# Patient Record
Sex: FEMALE | Race: BLACK OR AFRICAN AMERICAN | Employment: PART TIME | URBAN - METROPOLITAN AREA
[De-identification: names, ages, dates, MRNs, and addresses within clinical notes are randomized per-mention and may not be internally consistent; named-entity substitution may affect disease eponyms.]

---

## 2018-12-27 ENCOUNTER — HOSPITAL ENCOUNTER (EMERGENCY)
Age: 38
Discharge: HOME | End: 2018-12-28
Attending: EMERGENCY MEDICINE
Payer: COMMERCIAL

## 2018-12-27 DIAGNOSIS — R07.9 CHEST PAIN, UNSPECIFIED TYPE: Primary | ICD-10-CM

## 2018-12-27 DIAGNOSIS — K30 INDIGESTION: ICD-10-CM

## 2018-12-27 PROCEDURE — 85025 COMPLETE CBC W/AUTO DIFF WBC: CPT

## 2018-12-27 PROCEDURE — 80053 COMPREHEN METABOLIC PANEL: CPT

## 2018-12-27 PROCEDURE — 93005 ELECTROCARDIOGRAM TRACING: CPT | Performed by: EMERGENCY MEDICINE

## 2018-12-27 PROCEDURE — 84484 ASSAY OF TROPONIN QUANT: CPT

## 2018-12-27 PROCEDURE — 36415 COLL VENOUS BLD VENIPUNCTURE: CPT

## 2018-12-27 PROCEDURE — 99284 EMERGENCY DEPT VISIT MOD MDM: CPT

## 2018-12-27 ASSESSMENT — PAIN DESCRIPTION - PAIN TYPE: TYPE: ACUTE PAIN

## 2018-12-27 ASSESSMENT — PAIN SCALES - GENERAL: PAINLEVEL_OUTOF10: 6

## 2018-12-27 ASSESSMENT — PAIN DESCRIPTION - FREQUENCY: FREQUENCY: CONTINUOUS

## 2018-12-27 ASSESSMENT — PAIN DESCRIPTION - DESCRIPTORS: DESCRIPTORS: ACHING

## 2018-12-27 ASSESSMENT — PAIN DESCRIPTION - ORIENTATION: ORIENTATION: MID

## 2018-12-27 ASSESSMENT — PAIN DESCRIPTION - LOCATION: LOCATION: CHEST

## 2018-12-28 ENCOUNTER — APPOINTMENT (OUTPATIENT)
Dept: GENERAL RADIOLOGY | Age: 38
End: 2018-12-28
Payer: COMMERCIAL

## 2018-12-28 VITALS
HEART RATE: 75 BPM | DIASTOLIC BLOOD PRESSURE: 89 MMHG | RESPIRATION RATE: 18 BRPM | WEIGHT: 270.73 LBS | BODY MASS INDEX: 42.49 KG/M2 | OXYGEN SATURATION: 99 % | TEMPERATURE: 97 F | HEIGHT: 67 IN | SYSTOLIC BLOOD PRESSURE: 141 MMHG

## 2018-12-28 LAB
A/G RATIO: 1.3 (ref 1.1–2.2)
ALBUMIN SERPL-MCNC: 4 G/DL (ref 3.4–5)
ALP BLD-CCNC: 69 U/L (ref 40–129)
ALT SERPL-CCNC: 16 U/L (ref 10–40)
ANION GAP SERPL CALCULATED.3IONS-SCNC: 13 MMOL/L (ref 3–16)
AST SERPL-CCNC: 25 U/L (ref 15–37)
BASOPHILS ABSOLUTE: 0.1 K/UL (ref 0–0.2)
BASOPHILS RELATIVE PERCENT: 1.6 %
BILIRUB SERPL-MCNC: <0.2 MG/DL (ref 0–1)
BUN BLDV-MCNC: 13 MG/DL (ref 7–20)
CALCIUM SERPL-MCNC: 9.2 MG/DL (ref 8.3–10.6)
CHLORIDE BLD-SCNC: 101 MMOL/L (ref 99–110)
CO2: 23 MMOL/L (ref 21–32)
CREAT SERPL-MCNC: 0.8 MG/DL (ref 0.6–1.1)
EKG ATRIAL RATE: 82 BPM
EKG DIAGNOSIS: NORMAL
EKG P AXIS: 28 DEGREES
EKG P-R INTERVAL: 164 MS
EKG Q-T INTERVAL: 406 MS
EKG QRS DURATION: 98 MS
EKG QTC CALCULATION (BAZETT): 474 MS
EKG R AXIS: 47 DEGREES
EKG T AXIS: 43 DEGREES
EKG VENTRICULAR RATE: 82 BPM
EOSINOPHILS ABSOLUTE: 0.4 K/UL (ref 0–0.6)
EOSINOPHILS RELATIVE PERCENT: 5.3 %
GFR AFRICAN AMERICAN: >60
GFR NON-AFRICAN AMERICAN: >60
GLOBULIN: 3.1 G/DL
GLUCOSE BLD-MCNC: 114 MG/DL (ref 70–99)
HCT VFR BLD CALC: 40.2 % (ref 36–48)
HEMOGLOBIN: 13.6 G/DL (ref 12–16)
LYMPHOCYTES ABSOLUTE: 3.2 K/UL (ref 1–5.1)
LYMPHOCYTES RELATIVE PERCENT: 41.1 %
MCH RBC QN AUTO: 30.4 PG (ref 26–34)
MCHC RBC AUTO-ENTMCNC: 33.7 G/DL (ref 31–36)
MCV RBC AUTO: 90.2 FL (ref 80–100)
MONOCYTES ABSOLUTE: 0.5 K/UL (ref 0–1.3)
MONOCYTES RELATIVE PERCENT: 7 %
NEUTROPHILS ABSOLUTE: 3.5 K/UL (ref 1.7–7.7)
NEUTROPHILS RELATIVE PERCENT: 45 %
PDW BLD-RTO: 13.8 % (ref 12.4–15.4)
PLATELET # BLD: 268 K/UL (ref 135–450)
PMV BLD AUTO: 10.2 FL (ref 5–10.5)
POTASSIUM SERPL-SCNC: 4.1 MMOL/L (ref 3.5–5.1)
RBC # BLD: 4.46 M/UL (ref 4–5.2)
SODIUM BLD-SCNC: 137 MMOL/L (ref 136–145)
TOTAL PROTEIN: 7.1 G/DL (ref 6.4–8.2)
TROPONIN: <0.01 NG/ML
WBC # BLD: 7.8 K/UL (ref 4–11)

## 2018-12-28 PROCEDURE — 93010 ELECTROCARDIOGRAM REPORT: CPT | Performed by: INTERNAL MEDICINE

## 2018-12-28 PROCEDURE — 71045 X-RAY EXAM CHEST 1 VIEW: CPT

## 2018-12-28 PROCEDURE — 6370000000 HC RX 637 (ALT 250 FOR IP): Performed by: EMERGENCY MEDICINE

## 2018-12-28 RX ORDER — FAMOTIDINE 20 MG/1
20 TABLET, FILM COATED ORAL 2 TIMES DAILY
Qty: 60 TABLET | Refills: 0 | Status: SHIPPED | OUTPATIENT
Start: 2018-12-28 | End: 2019-12-03

## 2018-12-28 RX ADMIN — LIDOCAINE HYDROCHLORIDE: 20 SOLUTION ORAL; TOPICAL at 00:17

## 2018-12-28 ASSESSMENT — HEART SCORE: ECG: 1

## 2019-12-03 ENCOUNTER — OFFICE VISIT (OUTPATIENT)
Dept: PRIMARY CARE CLINIC | Age: 39
End: 2019-12-03
Payer: COMMERCIAL

## 2019-12-03 VITALS
HEIGHT: 68 IN | HEART RATE: 77 BPM | DIASTOLIC BLOOD PRESSURE: 102 MMHG | OXYGEN SATURATION: 99 % | TEMPERATURE: 97.7 F | WEIGHT: 271 LBS | SYSTOLIC BLOOD PRESSURE: 172 MMHG | BODY MASS INDEX: 41.07 KG/M2

## 2019-12-03 DIAGNOSIS — I10 ESSENTIAL HYPERTENSION: ICD-10-CM

## 2019-12-03 DIAGNOSIS — F32.1 CURRENT MODERATE EPISODE OF MAJOR DEPRESSIVE DISORDER WITHOUT PRIOR EPISODE (HCC): ICD-10-CM

## 2019-12-03 DIAGNOSIS — Z72.0 TOBACCO ABUSE: ICD-10-CM

## 2019-12-03 DIAGNOSIS — R01.1 HEART MURMUR: ICD-10-CM

## 2019-12-03 DIAGNOSIS — Z23 NEED FOR 23-POLYVALENT PNEUMOCOCCAL POLYSACCHARIDE VACCINE: ICD-10-CM

## 2019-12-03 DIAGNOSIS — Z00.00 PREVENTATIVE HEALTH CARE: ICD-10-CM

## 2019-12-03 DIAGNOSIS — K21.9 GASTROESOPHAGEAL REFLUX DISEASE WITHOUT ESOPHAGITIS: ICD-10-CM

## 2019-12-03 DIAGNOSIS — Z23 NEED FOR DIPHTHERIA-TETANUS-PERTUSSIS (TDAP) VACCINE: ICD-10-CM

## 2019-12-03 DIAGNOSIS — Z00.00 PREVENTATIVE HEALTH CARE: Primary | ICD-10-CM

## 2019-12-03 LAB
ALBUMIN SERPL-MCNC: 4.5 G/DL (ref 3.4–5)
ALP BLD-CCNC: 70 U/L (ref 40–129)
ALT SERPL-CCNC: 13 U/L (ref 10–40)
ANION GAP SERPL CALCULATED.3IONS-SCNC: 16 MMOL/L (ref 3–16)
AST SERPL-CCNC: 16 U/L (ref 15–37)
BASOPHILS ABSOLUTE: 0.1 K/UL (ref 0–0.2)
BASOPHILS RELATIVE PERCENT: 1 %
BILIRUB SERPL-MCNC: 0.3 MG/DL (ref 0–1)
BILIRUBIN DIRECT: <0.2 MG/DL (ref 0–0.3)
BILIRUBIN, INDIRECT: NORMAL MG/DL (ref 0–1)
BUN BLDV-MCNC: 12 MG/DL (ref 7–20)
CALCIUM SERPL-MCNC: 10 MG/DL (ref 8.3–10.6)
CHLORIDE BLD-SCNC: 104 MMOL/L (ref 99–110)
CHOLESTEROL, TOTAL: 195 MG/DL (ref 0–199)
CO2: 23 MMOL/L (ref 21–32)
CREAT SERPL-MCNC: 0.9 MG/DL (ref 0.6–1.1)
EOSINOPHILS ABSOLUTE: 0.2 K/UL (ref 0–0.6)
EOSINOPHILS RELATIVE PERCENT: 3 %
GFR AFRICAN AMERICAN: >60
GFR NON-AFRICAN AMERICAN: >60
GLUCOSE BLD-MCNC: 72 MG/DL (ref 70–99)
HCT VFR BLD CALC: 44.4 % (ref 36–48)
HDLC SERPL-MCNC: 63 MG/DL (ref 40–60)
HEMOGLOBIN: 14.7 G/DL (ref 12–16)
LDL CHOLESTEROL CALCULATED: 109 MG/DL
LYMPHOCYTES ABSOLUTE: 2.8 K/UL (ref 1–5.1)
LYMPHOCYTES RELATIVE PERCENT: 37.9 %
MCH RBC QN AUTO: 30.2 PG (ref 26–34)
MCHC RBC AUTO-ENTMCNC: 33.2 G/DL (ref 31–36)
MCV RBC AUTO: 91.1 FL (ref 80–100)
MONOCYTES ABSOLUTE: 0.6 K/UL (ref 0–1.3)
MONOCYTES RELATIVE PERCENT: 8.5 %
NEUTROPHILS ABSOLUTE: 3.6 K/UL (ref 1.7–7.7)
NEUTROPHILS RELATIVE PERCENT: 49.6 %
PDW BLD-RTO: 13.9 % (ref 12.4–15.4)
PHOSPHORUS: 3.1 MG/DL (ref 2.5–4.9)
PLATELET # BLD: 265 K/UL (ref 135–450)
PMV BLD AUTO: 10.3 FL (ref 5–10.5)
POTASSIUM SERPL-SCNC: 4.6 MMOL/L (ref 3.5–5.1)
RBC # BLD: 4.87 M/UL (ref 4–5.2)
SODIUM BLD-SCNC: 143 MMOL/L (ref 136–145)
TOTAL PROTEIN: 7.3 G/DL (ref 6.4–8.2)
TRIGL SERPL-MCNC: 115 MG/DL (ref 0–150)
TSH REFLEX: 1.1 UIU/ML (ref 0.27–4.2)
VLDLC SERPL CALC-MCNC: 23 MG/DL
WBC # BLD: 7.3 K/UL (ref 4–11)

## 2019-12-03 PROCEDURE — 90715 TDAP VACCINE 7 YRS/> IM: CPT | Performed by: INTERNAL MEDICINE

## 2019-12-03 PROCEDURE — 99386 PREV VISIT NEW AGE 40-64: CPT | Performed by: INTERNAL MEDICINE

## 2019-12-03 PROCEDURE — 90471 IMMUNIZATION ADMIN: CPT | Performed by: INTERNAL MEDICINE

## 2019-12-03 PROCEDURE — G8484 FLU IMMUNIZE NO ADMIN: HCPCS | Performed by: INTERNAL MEDICINE

## 2019-12-03 PROCEDURE — 90472 IMMUNIZATION ADMIN EACH ADD: CPT | Performed by: INTERNAL MEDICINE

## 2019-12-03 PROCEDURE — 90732 PPSV23 VACC 2 YRS+ SUBQ/IM: CPT | Performed by: INTERNAL MEDICINE

## 2019-12-03 RX ORDER — NIFEDIPINE 30 MG/1
30 TABLET, EXTENDED RELEASE ORAL DAILY
Qty: 90 TABLET | Refills: 1 | Status: SHIPPED | OUTPATIENT
Start: 2019-12-03 | End: 2020-11-19 | Stop reason: SDUPTHER

## 2019-12-03 RX ORDER — CHLORTHALIDONE 25 MG/1
25 TABLET ORAL DAILY
Qty: 90 TABLET | Refills: 1 | Status: SHIPPED | OUTPATIENT
Start: 2019-12-03 | End: 2020-11-19

## 2019-12-03 RX ORDER — OMEPRAZOLE 20 MG/1
20 CAPSULE, DELAYED RELEASE ORAL DAILY PRN
Qty: 30 CAPSULE | Refills: 5 | Status: SHIPPED | OUTPATIENT
Start: 2019-12-03 | End: 2022-01-27

## 2019-12-03 RX ORDER — NICOTINE 21 MG/24HR
1 PATCH, TRANSDERMAL 24 HOURS TRANSDERMAL DAILY
Qty: 30 PATCH | Refills: 5 | Status: SHIPPED | OUTPATIENT
Start: 2019-12-03 | End: 2020-01-14

## 2019-12-03 RX ORDER — BUPROPION HYDROCHLORIDE 150 MG/1
150 TABLET, EXTENDED RELEASE ORAL 2 TIMES DAILY
Qty: 60 TABLET | Refills: 3 | Status: CANCELLED | OUTPATIENT
Start: 2019-12-03

## 2019-12-03 RX ORDER — BUPROPION HYDROCHLORIDE 150 MG/1
150 TABLET ORAL EVERY MORNING
Qty: 90 TABLET | Refills: 1 | Status: SHIPPED | OUTPATIENT
Start: 2019-12-03 | End: 2020-01-14

## 2019-12-03 ASSESSMENT — PATIENT HEALTH QUESTIONNAIRE - PHQ9
2. FEELING DOWN, DEPRESSED OR HOPELESS: 1
SUM OF ALL RESPONSES TO PHQ QUESTIONS 1-9: 12
7. TROUBLE CONCENTRATING ON THINGS, SUCH AS READING THE NEWSPAPER OR WATCHING TELEVISION: 1
SUM OF ALL RESPONSES TO PHQ9 QUESTIONS 1 & 2: 4
5. POOR APPETITE OR OVEREATING: 3
9. THOUGHTS THAT YOU WOULD BE BETTER OFF DEAD, OR OF HURTING YOURSELF: 0
10. IF YOU CHECKED OFF ANY PROBLEMS, HOW DIFFICULT HAVE THESE PROBLEMS MADE IT FOR YOU TO DO YOUR WORK, TAKE CARE OF THINGS AT HOME, OR GET ALONG WITH OTHER PEOPLE: 1
6. FEELING BAD ABOUT YOURSELF - OR THAT YOU ARE A FAILURE OR HAVE LET YOURSELF OR YOUR FAMILY DOWN: 1
4. FEELING TIRED OR HAVING LITTLE ENERGY: 1
3. TROUBLE FALLING OR STAYING ASLEEP: 1
8. MOVING OR SPEAKING SO SLOWLY THAT OTHER PEOPLE COULD HAVE NOTICED. OR THE OPPOSITE, BEING SO FIGETY OR RESTLESS THAT YOU HAVE BEEN MOVING AROUND A LOT MORE THAN USUAL: 1
1. LITTLE INTEREST OR PLEASURE IN DOING THINGS: 3
SUM OF ALL RESPONSES TO PHQ QUESTIONS 1-9: 12
DEPRESSION UNABLE TO ASSESS: FUNCTIONAL CAPACITY MOTIVATION LIMITS ACCURACY

## 2019-12-03 ASSESSMENT — ENCOUNTER SYMPTOMS
COUGH: 0
VOMITING: 0
NAUSEA: 0
ABDOMINAL PAIN: 0
DIARRHEA: 0
SHORTNESS OF BREATH: 0
CONSTIPATION: 0

## 2019-12-04 DIAGNOSIS — I10 ESSENTIAL HYPERTENSION: ICD-10-CM

## 2019-12-04 DIAGNOSIS — E78.00 PURE HYPERCHOLESTEROLEMIA: Primary | ICD-10-CM

## 2019-12-04 LAB
ESTIMATED AVERAGE GLUCOSE: 111.2 MG/DL
HBA1C MFR BLD: 5.5 %
HIV AG/AB: NORMAL
HIV ANTIGEN: NORMAL
HIV-1 ANTIBODY: NORMAL
HIV-2 AB: NORMAL

## 2019-12-04 RX ORDER — ATORVASTATIN CALCIUM 10 MG/1
10 TABLET, FILM COATED ORAL DAILY
Qty: 30 TABLET | Refills: 5 | Status: SHIPPED | OUTPATIENT
Start: 2019-12-04 | End: 2020-01-14 | Stop reason: SDUPTHER

## 2020-01-14 ENCOUNTER — OFFICE VISIT (OUTPATIENT)
Dept: PRIMARY CARE CLINIC | Age: 40
End: 2020-01-14
Payer: COMMERCIAL

## 2020-01-14 VITALS
RESPIRATION RATE: 12 BRPM | OXYGEN SATURATION: 96 % | SYSTOLIC BLOOD PRESSURE: 134 MMHG | BODY MASS INDEX: 42.38 KG/M2 | HEIGHT: 67 IN | DIASTOLIC BLOOD PRESSURE: 82 MMHG | HEART RATE: 69 BPM | WEIGHT: 270 LBS

## 2020-01-14 PROBLEM — B00.9 HERPES: Status: ACTIVE | Noted: 2020-01-14

## 2020-01-14 PROCEDURE — 4004F PT TOBACCO SCREEN RCVD TLK: CPT | Performed by: INTERNAL MEDICINE

## 2020-01-14 PROCEDURE — 99214 OFFICE O/P EST MOD 30 MIN: CPT | Performed by: INTERNAL MEDICINE

## 2020-01-14 PROCEDURE — G8417 CALC BMI ABV UP PARAM F/U: HCPCS | Performed by: INTERNAL MEDICINE

## 2020-01-14 PROCEDURE — G8427 DOCREV CUR MEDS BY ELIG CLIN: HCPCS | Performed by: INTERNAL MEDICINE

## 2020-01-14 PROCEDURE — G8484 FLU IMMUNIZE NO ADMIN: HCPCS | Performed by: INTERNAL MEDICINE

## 2020-01-14 RX ORDER — BUPROPION HYDROCHLORIDE 300 MG/1
300 TABLET ORAL EVERY MORNING
Qty: 90 TABLET | Refills: 1 | Status: SHIPPED | OUTPATIENT
Start: 2020-01-14 | End: 2020-11-19

## 2020-01-14 RX ORDER — VALACYCLOVIR HYDROCHLORIDE 1 G/1
1000 TABLET, FILM COATED ORAL DAILY
Qty: 90 TABLET | Refills: 1 | Status: SHIPPED | OUTPATIENT
Start: 2020-01-14 | End: 2020-11-19

## 2020-01-14 RX ORDER — ATORVASTATIN CALCIUM 10 MG/1
10 TABLET, FILM COATED ORAL DAILY
Qty: 90 TABLET | Refills: 1 | Status: SHIPPED | OUTPATIENT
Start: 2020-01-14 | End: 2020-11-19

## 2020-01-14 ASSESSMENT — ENCOUNTER SYMPTOMS
SHORTNESS OF BREATH: 0
VOMITING: 0
DIARRHEA: 0
ABDOMINAL PAIN: 0
CONSTIPATION: 0
COUGH: 0
NAUSEA: 0

## 2020-01-14 NOTE — PATIENT INSTRUCTIONS

## 2020-11-18 ENCOUNTER — NURSE TRIAGE (OUTPATIENT)
Dept: OTHER | Facility: CLINIC | Age: 40
End: 2020-11-18

## 2020-11-18 ENCOUNTER — TELEPHONE (OUTPATIENT)
Dept: PRIMARY CARE CLINIC | Age: 40
End: 2020-11-18

## 2020-11-18 NOTE — TELEPHONE ENCOUNTER
Reason for Disposition   Patient wants to be seen    Answer Assessment - Initial Assessment Questions  1. BLOOD PRESSURE: \"What is the blood pressure? \" \"Did you take at least two measurements 5 minutes apart? \"        No     2. ONSET: \"When did you take your blood pressure? \"        NA    3. HOW: \"How did you obtain the blood pressure? \" (e.g., visiting nurse, automatic home BP monitor)        NA    4. HISTORY: \"Do you have a history of high blood pressure? \"        Yes    5. MEDICATIONS: Daphine Jessica you taking any medications for blood pressure? \" \"Have you missed any doses recently? \"        I supposed to be but I have been out of medicine since the end of July    6. OTHER SYMPTOMS: \"Do you have any symptoms? \" (e.g., headache, chest pain, blurred vision, difficulty breathing, weakness)         ankle swelling , can feel pressure in the back of head , stand up quick and get dizzy     7. PREGNANCY: \"Is there any chance you are pregnant? \" \"When was your last menstrual period? \"        No, 11/04/2020    Protocols used: HIGH BLOOD PRESSURE-ADULT-OH  Patient called pre-service center [facility] with red flag complaint. Brief description of triage: head pressure , ankle swelling , lightheadedness when standing    Triage indicates for patient to be seen today    Care advice provided, patient verbalizes understanding; denies any other questions or concerns; instructed to call back for any new or worsening symptoms. Writer provided warm transfer to [place] at [facility] for appointment scheduling. Attention Provider: Thank you for allowing me to participate in the care of your patient. The patient was connected to triage in response to information provided to the [facility]. Please do not respond through this encounter as the response is not directed to a shared pool.

## 2020-11-18 NOTE — TELEPHONE ENCOUNTER
----- Message from Shameka Ramirez sent at 11/18/2020 11:22 AM EST -----  Subject: Message to Provider    QUESTIONS  Information for Provider? URGENT APPOINTMENT Nurse Triaged? Tony Shayne Patient is   calling to schedule an appointment regarding blood pressure and   medication. Patient has been without medication for 3 months. Suggested   that patient is seen today.  ---------------------------------------------------------------------------  --------------  CALL BACK INFO  What is the best way for the office to contact you? OK to leave message on   voicemail  Preferred Call Back Phone Number? [phone]  ---------------------------------------------------------------------------  --------------  SCRIPT ANSWERS  Relationship to Patient?  Self

## 2020-11-18 NOTE — PROGRESS NOTES
2020    Kin Charles (:  1980) is a 38 y.o. female, here for evaluation of the following medical concerns:    Chief Complaint   Patient presents with   Joleen Schirmer Doctor       HPI  79-year-old -American female history of treated hypertension treated depression nicotine dependence GERD and recurrent herpes labialis prescribed chronic suppressive therapy using as needed comes in establish care. She last saw her primary last year, labs last December. HIV TSH A1c unremarkable at that time. Regarding her depression, tells me that she weaned herself off of Wellbutrin but feels that she is overall doing reasonably well. Regarding hypertension, she did not seek refills when she ran out of her 2 blood pressure medicines 6 months ago, has not checked her blood pressure but has noticed increasing leg swelling and headache, blood pressure elevated in the clinic today. Regarding her history of nicotine dependence, she unfortunately continues to smoke half a pack of cigarettes per day. Precontemplative. Regarding her GERD, she denies solid food dysphagia breakthrough symptoms, and is only using the Prilosec as needed, Tums more frequently. Regarding her herpes labialis, she is experiencing about 2 bouts a year, and will take 1000 mg Valtrex daily upon outbreak. Believes she has had 2 episodes of genital herpes in her life. Regarding her chart history of dyslipidemia, she was prescribed atorvastatin 10 mg daily which she took for a month or 2. Assuming SBP of 140, pretreatment labs confirm overall a 3.7% 10-year risk of a cardiovascular event. Review of Systems   Constitutional: Negative for activity change, appetite change, fatigue and unexpected weight change. HENT: Negative for dental problem, sinus pain, sore throat and trouble swallowing. Eyes: Negative for pain and visual disturbance.    Respiratory: Negative for apnea, cough, chest tightness, shortness of breath and wheezing. Cardiovascular: Negative for chest pain and palpitations. Gastrointestinal: Negative for abdominal pain, blood in stool, constipation, diarrhea, nausea, rectal pain and vomiting. Endocrine: Negative for cold intolerance, heat intolerance, polydipsia, polyphagia and polyuria. Genitourinary: Negative for difficulty urinating, dysuria, flank pain, frequency, hematuria, pelvic pain, urgency, vaginal bleeding and vaginal discharge. Musculoskeletal: Negative for arthralgias, back pain, gait problem, joint swelling, myalgias, neck pain and neck stiffness. Skin: Negative for color change and rash. Neurological: Negative for dizziness, tremors, syncope, speech difficulty, weakness, light-headedness and headaches. Hematological: Negative for adenopathy. Does not bruise/bleed easily. Psychiatric/Behavioral: Negative for agitation, behavioral problems, decreased concentration, sleep disturbance and suicidal ideas. The patient is not nervous/anxious and is not hyperactive. Prior to Visit Medications    Medication Sig Taking? Authorizing Provider   NIFEdipine (PROCARDIA XL) 30 MG extended release tablet Take 1 tablet by mouth daily Yes Miriam Webber MD   busPIRone (BUSPAR) 10 MG tablet Take 1 tablet by mouth 2 times daily as needed (anxiety) Yes Miriam Webber MD   chlorthalidone (HYGROTON) 25 MG tablet Take 1 tablet by mouth daily Yes Miriam Webber MD   valACYclovir (VALTREX) 1 g tablet Take 2 tablets by mouth 2 times daily for 1 day At first sign of cold sore Yes Miriam Webber MD   omeprazole (PRILOSEC) 20 MG delayed release capsule Take 1 capsule by mouth daily as needed (heartburn) Yes Royal Rangel MD        No Known Allergies    No past medical history on file. Hypertension, 2019. Herpes labialis, 2019  No past surgical history on file.     Social History     Socioeconomic History    Marital status: Single     Spouse name: Not on file    Number of children: Not on file  Years of education: Not on file    Highest education level: Not on file   Occupational History    Not on file   Social Needs    Financial resource strain: Not on file    Food insecurity     Worry: Not on file     Inability: Not on file    Transportation needs     Medical: Not on file     Non-medical: Not on file   Tobacco Use    Smoking status: Current Every Day Smoker     Packs/day: 1.00     Types: Cigarettes    Smokeless tobacco: Never Used   Substance and Sexual Activity    Alcohol use: Yes     Comment: occ    Drug use: Not on file    Sexual activity: Not Currently     Birth control/protection: Condom   Lifestyle    Physical activity     Days per week: Not on file     Minutes per session: Not on file    Stress: Not on file   Relationships    Social connections     Talks on phone: Not on file     Gets together: Not on file     Attends Oriental orthodox service: Not on file     Active member of club or organization: Not on file     Attends meetings of clubs or organizations: Not on file     Relationship status: Not on file    Intimate partner violence     Fear of current or ex partner: Not on file     Emotionally abused: Not on file     Physically abused: Not on file     Forced sexual activity: Not on file   Other Topics Concern    Not on file   Social History Narrative    Not on file        No family history on file. Vitals:    11/19/20 1123 11/19/20 1133   BP: (!) 152/111 (!) 152/110   Pulse: 79    Temp: 97.2 °F (36.2 °C)    TempSrc: Oral    SpO2: 99%    Weight: 276 lb (125.2 kg)    Height: 5' 7\" (1.702 m)      Estimated body mass index is 43.23 kg/m² as calculated from the following:    Height as of this encounter: 5' 7\" (1.702 m). Weight as of this encounter: 276 lb (125.2 kg). PHYSICAL EXAM  GENERAL:  Pleasant  female who looks her stated age, awake alert and oriented x3, no acute distress. PSYCH: Quite animated, good eye contact. Unrestricted affect range.   Mood congruent with affect. Linear thought. LABS  Lab Review   Orders Only on 12/03/2019   Component Date Value    TSH 12/03/2019 1.10     HIV Ag/Ab 12/03/2019 Non-Reactive     HIV-1 Antibody 12/03/2019 Non-Reactive     HIV ANTIGEN 12/03/2019 Non-Reactive     HIV-2 Ab 12/03/2019 Non-Reactive     Sodium 12/03/2019 143     Potassium 12/03/2019 4.6     Chloride 12/03/2019 104     CO2 12/03/2019 23     Anion Gap 12/03/2019 16     Glucose 12/03/2019 72     BUN 12/03/2019 12     CREATININE 12/03/2019 0.9     GFR Non- 12/03/2019 >60     GFR  12/03/2019 >60     Calcium 12/03/2019 10.0     Phosphorus 12/03/2019 3.1     Alb 12/03/2019 4.5     Cholesterol, Total 12/03/2019 195     Triglycerides 12/03/2019 115     HDL 12/03/2019 63*    LDL Calculated 12/03/2019 109*    VLDL Cholesterol Calcula* 12/03/2019 23     Total Protein 12/03/2019 7.3     Alkaline Phosphatase 12/03/2019 70     ALT 12/03/2019 13     AST 12/03/2019 16     Total Bilirubin 12/03/2019 0.3     Bilirubin, Direct 12/03/2019 <0.2     Bilirubin, Indirect 12/03/2019 see below     Hemoglobin A1C 12/03/2019 5.5     eAG 12/03/2019 111.2     WBC 12/03/2019 7.3     RBC 12/03/2019 4.87     Hemoglobin 12/03/2019 14.7     Hematocrit 12/03/2019 44.4     MCV 12/03/2019 91.1     MCH 12/03/2019 30.2     MCHC 12/03/2019 33.2     RDW 12/03/2019 13.9     Platelets 64/59/0089 265     MPV 12/03/2019 10.3     Neutrophils % 12/03/2019 49.6     Lymphocytes % 12/03/2019 37.9     Monocytes % 12/03/2019 8.5     Eosinophils % 12/03/2019 3.0     Basophils % 12/03/2019 1.0     Neutrophils Absolute 12/03/2019 3.6     Lymphocytes Absolute 12/03/2019 2.8     Monocytes Absolute 12/03/2019 0.6     Eosinophils Absolute 12/03/2019 0.2     Basophils Absolute 12/03/2019 0.1          ASSESSMENT/PLAN  1. Morbidly obese ([address]  She stress eats, has gained about 5 pounds since her last office visit.   She snores infrequently she tells me, denies a.m. headaches dry mouth in the morning. Not take naps in the afternoon. Educated patient that weight loss agents without change in diet and exercise tend to resulted in weight yoyoing back up to baseline. 2. Essential hypertension  Wonder about sleep apnea. Update electrolytes neuroid normal last year. Asked patient to get home cuff bring record to her follow-up visit.  - NIFEipine (PROCARDIA XL) 30 MG extended release tablet; Take 1 tablet by mouth daily  Dispense: 90 tablet; Refill: 3  - ALT; Future  - Lipid Panel; Future  - Basic Metabolic Panel; Future    3. Need for immunization against influenza  Nicotine abuse, Covid led to strongly recommend influenza vaccine this year, which the patient was agreeable to.    4. Gastroesophageal reflux disease without esophagitis  No red flags. Discussed Pepcid if Tums was inadequate as a safe alternative to Prilosec. 5. Depression, major, single episode, mild ([address]  She agrees that anxiety is often troubles her and we educated her on Wellbutrin not being abortive therapy. Prescribed BuSpar to try as needed. 6. Tobacco abuse  Precontemplative. 7 herpes labialis. Recommended abortive therapy only, 2 g twice daily for total of 2 doses at first sign of herpes. Prescription provided. 8.  Health maintenance issues. Update lipid profile, electrolytes, glucose, TSH normal last year. Patient gets Pap smears every year or 2 at [facility]. Presume they will coordinate/discuss mammogram.      Return in about 4 weeks (around 12/17/2020) for HTN .       Colin Gomez MD

## 2020-11-19 ENCOUNTER — TELEPHONE (OUTPATIENT)
Dept: PRIMARY CARE CLINIC | Age: 40
End: 2020-11-19

## 2020-11-19 ENCOUNTER — OFFICE VISIT (OUTPATIENT)
Dept: PRIMARY CARE CLINIC | Age: 40
End: 2020-11-19
Payer: COMMERCIAL

## 2020-11-19 VITALS
HEART RATE: 79 BPM | BODY MASS INDEX: 43.32 KG/M2 | DIASTOLIC BLOOD PRESSURE: 110 MMHG | SYSTOLIC BLOOD PRESSURE: 152 MMHG | OXYGEN SATURATION: 99 % | TEMPERATURE: 97.2 F | WEIGHT: 276 LBS | HEIGHT: 67 IN

## 2020-11-19 PROBLEM — B00.9 HERPES: Status: RESOLVED | Noted: 2020-01-14 | Resolved: 2020-11-19

## 2020-11-19 PROBLEM — F32.1 CURRENT MODERATE EPISODE OF MAJOR DEPRESSIVE DISORDER WITHOUT PRIOR EPISODE (HCC): Status: RESOLVED | Noted: 2019-12-03 | Resolved: 2020-11-19

## 2020-11-19 PROBLEM — E66.01 MORBIDLY OBESE (HCC): Status: ACTIVE | Noted: 2020-11-19

## 2020-11-19 PROBLEM — E78.00 PURE HYPERCHOLESTEROLEMIA: Status: RESOLVED | Noted: 2019-12-04 | Resolved: 2020-11-19

## 2020-11-19 PROCEDURE — 90471 IMMUNIZATION ADMIN: CPT | Performed by: INTERNAL MEDICINE

## 2020-11-19 PROCEDURE — 90686 IIV4 VACC NO PRSV 0.5 ML IM: CPT | Performed by: INTERNAL MEDICINE

## 2020-11-19 PROCEDURE — 99214 OFFICE O/P EST MOD 30 MIN: CPT | Performed by: INTERNAL MEDICINE

## 2020-11-19 RX ORDER — VALACYCLOVIR HYDROCHLORIDE 1 G/1
2000 TABLET, FILM COATED ORAL 2 TIMES DAILY
Qty: 8 TABLET | Refills: 0 | Status: SHIPPED | OUTPATIENT
Start: 2020-11-19 | End: 2020-11-20

## 2020-11-19 RX ORDER — CHLORTHALIDONE 25 MG/1
25 TABLET ORAL DAILY
Qty: 90 TABLET | Refills: 3 | Status: SHIPPED | OUTPATIENT
Start: 2020-11-19 | End: 2022-01-10

## 2020-11-19 RX ORDER — BUSPIRONE HYDROCHLORIDE 10 MG/1
10 TABLET ORAL 2 TIMES DAILY PRN
Qty: 60 TABLET | Refills: 1 | Status: SHIPPED | OUTPATIENT
Start: 2020-11-19 | End: 2020-12-19

## 2020-11-19 RX ORDER — NIFEDIPINE 30 MG/1
30 TABLET, EXTENDED RELEASE ORAL DAILY
Qty: 90 TABLET | Refills: 3 | Status: SHIPPED | OUTPATIENT
Start: 2020-11-19 | End: 2022-01-10

## 2020-11-19 NOTE — PATIENT INSTRUCTIONS
1. If tums not controlling your reflux symptoms, consider OTC Pepcid, if really needed omeprazole  2. Rxs for buspar 10mg 2x/day AS NEEDED (not every day)  3. Call when want to set a quit date  4. Rx for valtrex chlorthal nifedipine sent  5. Get a BP cuff, large adult autoinflating LCD readout. Check your BP 10 x over the next month, after starting medicine, and bring to f/u visit   6.  Blood work next month before f/u visit

## 2021-05-18 VITALS
HEART RATE: 81 BPM | OXYGEN SATURATION: 98 % | DIASTOLIC BLOOD PRESSURE: 67 MMHG | BODY MASS INDEX: 41.21 KG/M2 | RESPIRATION RATE: 19 BRPM | HEIGHT: 67 IN | WEIGHT: 262.57 LBS | SYSTOLIC BLOOD PRESSURE: 183 MMHG | TEMPERATURE: 97.9 F

## 2021-05-18 ASSESSMENT — PAIN DESCRIPTION - PROGRESSION: CLINICAL_PROGRESSION: NOT CHANGED

## 2021-05-18 ASSESSMENT — PAIN DESCRIPTION - ONSET: ONSET: ON-GOING

## 2021-05-18 ASSESSMENT — PAIN DESCRIPTION - LOCATION: LOCATION: BACK;NECK

## 2021-05-18 ASSESSMENT — PAIN DESCRIPTION - PAIN TYPE: TYPE: ACUTE PAIN

## 2021-05-19 ENCOUNTER — HOSPITAL ENCOUNTER (EMERGENCY)
Age: 41
Discharge: LEFT BEFORE DISCHARGE | End: 2021-05-19
Payer: COMMERCIAL

## 2021-06-03 ENCOUNTER — TELEPHONE (OUTPATIENT)
Dept: PRIMARY CARE CLINIC | Age: 41
End: 2021-06-03

## 2021-06-03 NOTE — TELEPHONE ENCOUNTER
----- Message from Nelly Magdiel sent at 6/3/2021  2:14 PM EDT -----  Subject: Message to Provider    QUESTIONS  Information for Provider? Pt. would like to have a yellow fever vaccine   done. [place] is going out [place] the [place] ([place])  ---------------------------------------------------------------------------  --------------  [address]  What is the best way for the office to contact you? OK to leave message on   voicemail  Preferred Call Back Phone Number? [phone]  ---------------------------------------------------------------------------  --------------  SCRIPT ANSWERS  Relationship to Patient?  Self

## 2021-06-04 ENCOUNTER — OFFICE VISIT (OUTPATIENT)
Dept: PRIMARY CARE CLINIC | Age: 41
End: 2021-06-04
Payer: COMMERCIAL

## 2021-06-04 DIAGNOSIS — Z20.822 SUSPECTED COVID-19 VIRUS INFECTION: Primary | ICD-10-CM

## 2021-06-04 PROCEDURE — 99421 OL DIG E/M SVC 5-10 MIN: CPT | Performed by: NURSE PRACTITIONER

## 2021-06-05 LAB — SARS-COV-2, PCR: NOT DETECTED

## 2021-06-08 NOTE — TELEPHONE ENCOUNTER
Patient is going to [employer] to get vaccine but needs a prescription so she does not have to pay for a consultation fee that cost more than the vaccine. Otherwise she will have to pay for both without a prescription.   Please advise

## 2021-06-08 NOTE — TELEPHONE ENCOUNTER
Dr. Serina Hall, I am referring this back to your staff, they should know if we have this or where they referred patients in the past. From now on, I would send all questions to the [address] [place] [address] Rabia VELASCO([phone] . This is your MA pool that all your messages should be going back to, they should be able to help you. Sorry I do not know much about this.

## 2021-06-08 NOTE — TELEPHONE ENCOUNTER
Yesterday I Printed the YF-VAX prescription for her to take to whereever the patient wants to go she needs to simply come and pick it up. We need do nothing further.

## 2021-06-16 ENCOUNTER — OFFICE VISIT (OUTPATIENT)
Dept: PRIMARY CARE CLINIC | Age: 41
End: 2021-06-16
Payer: COMMERCIAL

## 2021-06-16 VITALS
TEMPERATURE: 97.2 F | SYSTOLIC BLOOD PRESSURE: 128 MMHG | WEIGHT: 252.4 LBS | HEIGHT: 67 IN | DIASTOLIC BLOOD PRESSURE: 83 MMHG | OXYGEN SATURATION: 96 % | BODY MASS INDEX: 39.62 KG/M2 | HEART RATE: 92 BPM

## 2021-06-16 DIAGNOSIS — M79.89 LEG SWELLING: ICD-10-CM

## 2021-06-16 DIAGNOSIS — N39.46 MIXED INCONTINENCE URGE AND STRESS: Primary | ICD-10-CM

## 2021-06-16 PROCEDURE — G8417 CALC BMI ABV UP PARAM F/U: HCPCS | Performed by: INTERNAL MEDICINE

## 2021-06-16 PROCEDURE — 4004F PT TOBACCO SCREEN RCVD TLK: CPT | Performed by: INTERNAL MEDICINE

## 2021-06-16 PROCEDURE — G8427 DOCREV CUR MEDS BY ELIG CLIN: HCPCS | Performed by: INTERNAL MEDICINE

## 2021-06-16 PROCEDURE — 99213 OFFICE O/P EST LOW 20 MIN: CPT | Performed by: INTERNAL MEDICINE

## 2021-06-16 RX ORDER — FUROSEMIDE 40 MG/1
40 TABLET ORAL DAILY
Qty: 30 TABLET | Refills: 1 | Status: SHIPPED | OUTPATIENT
Start: 2021-06-16 | End: 2022-01-27

## 2021-06-16 SDOH — ECONOMIC STABILITY: FOOD INSECURITY: WITHIN THE PAST 12 MONTHS, THE FOOD YOU BOUGHT JUST DIDN'T LAST AND YOU DIDN'T HAVE MONEY TO GET MORE.: NEVER TRUE

## 2021-06-16 SDOH — ECONOMIC STABILITY: FOOD INSECURITY: WITHIN THE PAST 12 MONTHS, YOU WORRIED THAT YOUR FOOD WOULD RUN OUT BEFORE YOU GOT MONEY TO BUY MORE.: NEVER TRUE

## 2021-06-16 ASSESSMENT — PATIENT HEALTH QUESTIONNAIRE - PHQ9
SUM OF ALL RESPONSES TO PHQ9 QUESTIONS 1 & 2: 0
2. FEELING DOWN, DEPRESSED OR HOPELESS: 0
SUM OF ALL RESPONSES TO PHQ QUESTIONS 1-9: 0
1. LITTLE INTEREST OR PLEASURE IN DOING THINGS: 0
SUM OF ALL RESPONSES TO PHQ QUESTIONS 1-9: 0
SUM OF ALL RESPONSES TO PHQ QUESTIONS 1-9: 0

## 2021-06-16 ASSESSMENT — SOCIAL DETERMINANTS OF HEALTH (SDOH): HOW HARD IS IT FOR YOU TO PAY FOR THE VERY BASICS LIKE FOOD, HOUSING, MEDICAL CARE, AND HEATING?: HARD

## 2021-06-16 NOTE — PATIENT INSTRUCTIONS
1. Compression hose  2. mirabegron if insurance will cover  3. Urine test today  4. Lasix instead of chlorthalidone as needed once daily  5. Low sodium diet to reduce swelling  6.  KIEGEL exercises 3x/day

## 2021-06-16 NOTE — PROGRESS NOTES
2021    Juan A Reynolds (:  1980) is a 38 y.o. female, here for evaluation of the following medical concerns:    Chief Complaint   Patient presents with    Other     over active bladder       HPI  48-year-old -American female history of treated hypertension treated depression nicotine dependence GERD and recurrent herpes labialis prescribed chronic suppressive therapy, obesity BMI 39, who comes in with 2 concerns:  1. Lower extremity swelling. While on vacation she noticed leg swelling severe enough that it prevented her from wearing some of her shoes. She was walking around quite a lot, eating out at restaurants and not particularly following any low-sodium strategy. She is on chlorthalidone. No associated PND orthopnea patient has no known history of cardiomyopathy. Chose not to pursue echocardiogram ordered in 2019. Normal creatinine no history of nephrotic syndrome. 2.  Urge incontinence and stress incontinence. Ever since her last child was born , she leaks when she coughs laughs sneezes. She knows what do Kegel exercises but does not do them. She is premenopausal.  She is also troubled by frequent urges to urinate, occasionally with incontinence. Last pelvic exam a couple years ago does not recall being told she had a cystocele or enterocele. Has not seen urology. Denies dysuria, is sure that she does not have a UTI. At time of her last visit, we noted:  Regarding her depression, tells me that she weaned herself off of Wellbutrin but feels that she is overall doing reasonably well. Regarding hypertension, states that she is taking her blood pressure medicine as prescribed. Unfortunately, is not monitoring her blood pressure though blood pressure not too bad today in the clinic. .  Regarding her history of nicotine dependence, she unfortunately continues to smoke half a pack of cigarettes per day. Precontemplative.   Regarding her GERD, she denies solid food dysphagia breakthrough symptoms, and is only using the Prilosec as needed, Tums more frequently. Regarding her herpes labialis, she is experiencing about 2 bouts a year, and will take 1000 mg Valtrex daily upon outbreak. Believes she has had 2 episodes of genital herpes in her life. Regarding her chart history of dyslipidemia, she was prescribed atorvastatin 10 mg daily which she took for a month or 2. Assuming SBP of 140, pretreatment labs confirm overall a 3.7% 10-year risk of a cardiovascular event therefore no strong indication. Review of Systems   Constitutional: Negative for activity change, appetite change, fatigue and unexpected weight change. HENT: Negative for dental problem, sinus pain, sore throat and trouble swallowing. Eyes: Negative for pain and visual disturbance. Respiratory: Negative for apnea, cough, chest tightness, shortness of breath and wheezing. Cardiovascular: Negative for chest pain and palpitations. Gastrointestinal: Negative for abdominal pain, blood in stool, constipation, diarrhea, nausea, rectal pain and vomiting. Endocrine: Negative for cold intolerance, heat intolerance, polydipsia, polyphagia and polyuria. Genitourinary: Negative for difficulty urinating, dysuria, flank pain, frequency, hematuria, pelvic pain, urgency, vaginal bleeding and vaginal discharge. Musculoskeletal: Negative for arthralgias, back pain, gait problem, joint swelling, myalgias, neck pain and neck stiffness. Skin: Negative for color change and rash. Neurological: Negative for dizziness, tremors, syncope, speech difficulty, weakness, light-headedness and headaches. Hematological: Negative for adenopathy. Does not bruise/bleed easily. Psychiatric/Behavioral: Negative for agitation, behavioral problems, decreased concentration, sleep disturbance and suicidal ideas. The patient is not nervous/anxious and is not hyperactive.         Prior to Visit Medications    Medication Sig Meetings:     Marital Status:    Intimate Partner Violence:     Fear of Current or Ex-Partner:     Emotionally Abused:     Physically Abused:     Sexually Abused:         No family history on file. Vitals:    06/16/21 1548   BP: 128/83   Pulse: 92   Temp: 97.2 °F (36.2 °C)   TempSrc: Temporal   SpO2: 96%   Weight: 252 lb 6.4 oz (114.5 kg)   Height: 5' 7\" (1.702 m)     Estimated body mass index is 39.53 kg/m² as calculated from the following:    Height as of this encounter: 5' 7\" (1.702 m). Weight as of this encounter: 252 lb 6.4 oz (114.5 kg). PHYSICAL EXAM  GENERAL:  Pleasant  female who looks her stated age, awake alert and oriented x3, no acute distress. EXTREMITIES: No lower extremity edema currently. PSYCH: Quite animated, good eye contact. Unrestricted affect range. Mood congruent with affect. Linear thought. LABS  Lab Review   Office Visit on 06/04/2021   Component Date Value    SARS-CoV-2, PCR 06/04/2021 Not Detected          ASSESSMENT/PLAN  1. Morbidly obese ([address]  Patient has lost 20 pounds over the last 6 months, intentionally. She can keep up this wonderful trend. 2. Essential hypertension  Wonder about sleep apnea. Up to date electrolytes neuroid normal last year. Asked patient to get home cuff bring record to her follow-up visit, she elected not to do so. She elected not to monitor her blood pressures outpatient though not too bad today here in the clinic on dual chlorthalidone nifedipine. 3.  Intermittent peripheral edema  Nifedipine potentially contributory, ideally she should dose at bedtime. Weight loss is helping. Sodium indiscretion discouraged, compression hose (for example runner compression stockings) can be considered though perhaps not too comfortable in the summer. Provided patient option to use loop diuretic as needed set of chlorthalidone, encouraged her to keep her legs elevated.     4. Gastroesophageal reflux disease without

## 2021-06-18 ENCOUNTER — TELEPHONE (OUTPATIENT)
Dept: UROGYNECOLOGY | Age: 41
End: 2021-06-18

## 2021-06-18 NOTE — TELEPHONE ENCOUNTER
Received referral per Dr Van Melo    Dx: Mixed incontinence urge and stress    called and left vm for patient to call the office at [phone]. We were following up on a referral received in our office.

## 2021-06-22 ENCOUNTER — PATIENT MESSAGE (OUTPATIENT)
Dept: PRIMARY CARE CLINIC | Age: 41
End: 2021-06-22

## 2021-06-22 DIAGNOSIS — M79.89 LEG SWELLING: ICD-10-CM

## 2021-06-23 RX ORDER — FUROSEMIDE 40 MG/1
40 TABLET ORAL DAILY
Qty: 30 TABLET | Refills: 1 | OUTPATIENT
Start: 2021-06-23

## 2021-06-23 NOTE — TELEPHONE ENCOUNTER
From: Rosario Teixeira  To: Krystle Paulino MD  Sent: 6/22/2021 7:46 PM EDT  Subject: Prescription Question    Did you send my prescriptions?

## 2021-06-23 NOTE — TELEPHONE ENCOUNTER
Please ask patient why we are getting a request to refill Myrbetriq and Lasix when prescription was just provided a week ago? Wrong pharmacy?

## 2021-06-25 ENCOUNTER — TELEPHONE (OUTPATIENT)
Dept: UROGYNECOLOGY | Age: 41
End: 2021-06-25

## 2021-06-25 NOTE — TELEPHONE ENCOUNTER
Received referral per Dr Kendra Livingston  Dx: Mixed incontinence urge and stress     called and left  for patient to call the office at [phone].   We were following up on a referral received in our office

## 2021-07-07 ENCOUNTER — OFFICE VISIT (OUTPATIENT)
Dept: PRIMARY CARE CLINIC | Age: 41
End: 2021-07-07
Payer: COMMERCIAL

## 2021-07-07 DIAGNOSIS — Z11.59 SCREENING FOR VIRAL DISEASE: Primary | ICD-10-CM

## 2021-07-07 LAB — SARS-COV-2: NOT DETECTED

## 2021-07-07 PROCEDURE — 99211 OFF/OP EST MAY X REQ PHY/QHP: CPT | Performed by: NURSE PRACTITIONER

## 2021-07-07 PROCEDURE — G8428 CUR MEDS NOT DOCUMENT: HCPCS | Performed by: NURSE PRACTITIONER

## 2021-07-07 PROCEDURE — G8417 CALC BMI ABV UP PARAM F/U: HCPCS | Performed by: NURSE PRACTITIONER

## 2021-07-07 NOTE — PROGRESS NOTES
Flavio Jenkins received a viral test for COVID-19. They were educated on isolation and quarantine as appropriate. For any symptoms, they were directed to seek care from their PCP, given contact information to establish with a doctor, directed to an urgent care or the emergency room.

## 2021-07-07 NOTE — PATIENT INSTRUCTIONS
Advance Care Planning  People with COVID-19 may have no symptoms, mild symptoms, such as fever, cough, and shortness of breath or they may have more severe illness, developing severe and fatal pneumonia. As a result, Advance Care Planning with attention to naming a health care decision maker (someone you trust to make healthcare decisions for you if you could not speak for yourself) and sharing other health care preferences is important BEFORE a possible health crisis. Please contact your Primary Care Provider to discuss Advance Care Planning. Preventing the Spread of Coronavirus Disease 2019 in Homes and Residential Communities  For the most recent information go to [email or web address]    Prevention steps for People with confirmed or suspected COVID-19 (including persons under investigation) who do not need to be hospitalized  and   People with confirmed COVID-19 who were hospitalized and determined to be medically stable to go home    Your healthcare provider and public health staff will evaluate whether you can be cared for at home. If it is determined that you do not need to be hospitalized and can be isolated at home, you will be monitored by staff from your local or state health department. You should follow the prevention steps below until a healthcare provider or local or state health department says you can return to your normal activities. Stay home except to get medical care  People who are mildly ill with COVID-19 are able to isolate at home during their illness. You should restrict activities outside your home, except for getting medical care. Do not go to work, school, or public areas. Avoid using public transportation, ride-sharing, or taxis. Separate yourself from other people and animals in your home  People: As much as possible, you should stay in a specific room and away from other people in your home.  Also, you should use a separate before eating or preparing food. If soap and water are not readily available, use an alcohol-based hand  with at least 60% alcohol, covering all surfaces of your hands and rubbing them together until they feel dry. Soap and water are the best option if hands are visibly dirty. Avoid touching your eyes, nose, and mouth with unwashed hands. Avoid sharing personal household items  You should not share dishes, drinking glasses, cups, eating utensils, towels, or bedding with other people or pets in your home. After using these items, they should be washed thoroughly with soap and water. Clean all high-touch surfaces everyday  High touch surfaces include counters, tabletops, doorknobs, bathroom fixtures, toilets, phones, keyboards, tablets, and bedside tables. Also, clean any surfaces that may have blood, stool, or body fluids on them. Use a household cleaning spray or wipe, according to the label instructions. Labels contain instructions for safe and effective use of the cleaning product including precautions you should take when applying the product, such as wearing gloves and making sure you have good ventilation during use of the product. Monitor your symptoms  Seek prompt medical attention if your illness is worsening (e.g., difficulty breathing). Before seeking care, call your healthcare provider and tell them that you have, or are being evaluated for, COVID-19. Put on a facemask before you enter the facility. These steps will help the healthcare providers office to keep other people in the office or waiting room from getting infected or exposed. Ask your healthcare provider to call the local or state health department. Persons who are placed under active monitoring or facilitated self-monitoring should follow instructions provided by their local health department or occupational health professionals, as appropriate. When working with your local health department check their available hours.   If you

## 2021-07-21 ENCOUNTER — OFFICE VISIT (OUTPATIENT)
Dept: UROGYNECOLOGY | Age: 41
End: 2021-07-21
Payer: COMMERCIAL

## 2021-07-21 VITALS
OXYGEN SATURATION: 99 % | HEART RATE: 78 BPM | TEMPERATURE: 98 F | RESPIRATION RATE: 14 BRPM | DIASTOLIC BLOOD PRESSURE: 88 MMHG | SYSTOLIC BLOOD PRESSURE: 123 MMHG

## 2021-07-21 DIAGNOSIS — N39.41 URGE INCONTINENCE: ICD-10-CM

## 2021-07-21 DIAGNOSIS — R39.15 URINARY URGENCY: Primary | ICD-10-CM

## 2021-07-21 DIAGNOSIS — R35.0 URINARY FREQUENCY: ICD-10-CM

## 2021-07-21 LAB
EMPTY COUGH STRESS TEST: NEGATIVE
FIRST SENSATION: 70 CC
FULL COUGH STRESS TEST: NORMAL
MAX SENSATION: NORMAL
NITRATE, URINE POC: NORMAL
POST VOID RESIDUAL (PVR): 40 ML
RBC URINE, POC: NORMAL
SECOND SENSATION: NORMAL
SPASM: NORMAL
WBC URINE, POC: NORMAL

## 2021-07-21 PROCEDURE — G8417 CALC BMI ABV UP PARAM F/U: HCPCS | Performed by: OBSTETRICS & GYNECOLOGY

## 2021-07-21 PROCEDURE — G8427 DOCREV CUR MEDS BY ELIG CLIN: HCPCS | Performed by: OBSTETRICS & GYNECOLOGY

## 2021-07-21 PROCEDURE — 99244 OFF/OP CNSLTJ NEW/EST MOD 40: CPT | Performed by: OBSTETRICS & GYNECOLOGY

## 2021-07-21 PROCEDURE — 51725 SIMPLE CYSTOMETROGRAM: CPT | Performed by: OBSTETRICS & GYNECOLOGY

## 2021-07-21 PROCEDURE — 81002 URINALYSIS NONAUTO W/O SCOPE: CPT | Performed by: OBSTETRICS & GYNECOLOGY

## 2021-07-21 RX ORDER — OXYBUTYNIN CHLORIDE 10 MG/1
10 TABLET, EXTENDED RELEASE ORAL DAILY
Qty: 30 TABLET | Refills: 1 | Status: SHIPPED | OUTPATIENT
Start: 2021-07-21 | End: 2022-01-27

## 2021-07-21 RX ORDER — ATOVAQUONE AND PROGUANIL HYDROCHLORIDE 250; 100 MG/1; MG/1
TABLET, FILM COATED ORAL
COMMUNITY
Start: 2021-06-09 | End: 2021-07-21

## 2021-07-21 NOTE — PROGRESS NOTES
2021      HPI:     Name: Darla Koyanagi  YOB: 1980    CC: Patient is a 42 y.o. female who is seen in consultation from Therese Farris MD   for evaluation of urge incontinence . HPI:  Bladder control problem: Yes   How many months have you had a bladder problem? 2 years  Do you use pads to absorb lost urine? Yes. If yes how many pads do you wear a day? 2   How many trips do you make to the bathroom during the day? 10-15  How many times do you wake at night to go to the bathroom? 3  Do you ever wet the bed while asleep? No  Are there times when you cannot make it to the bathroom on time? Yes  Does sound, sight, or feel of running water cause you to lose urine? Yes  Which best describes urine loss: (Check all that apply)   [x] I lose urine during coughing, sneezing, running, lifting   [] I lose urine with changes in posture, standing, walking   [] I lose urine continuously such that I am constantly wet   [x] I have sudden, urgent needs without the ability to make it to the bathroom  Have you seen a physician for complaints of urine loss? No   Have you taken medication to prevent urine loss? No   How many glasses of liquid do you consume daily? 8  How many drinks containing caffeine do you consume daily? 4    Bladder emptying problems:  No   Prolapse/Vaginal Support problems: No   Bowel problem(s): No   Sexual History:  reports previously being sexually active. She reports using the following method of birth control/protection: Condom. Pelvic Pain:  No   Ob/Gyn History:    OB History    Para Term  AB Living   2 2 2     2   SAB TAB Ectopic Molar Multiple Live Births             2      # Outcome Date GA Lbr Car/2nd Weight Sex Delivery Anes PTL Lv   2 Term      Vag-Spont   KIRIT   1 Term      Vag-Spont   KIRIT     Past Medical History: History reviewed. No pertinent past medical history. Past Surgical History: History reviewed. No pertinent surgical history.   Allergies: No Known Allergies  Current Medications:  Current Outpatient Medications   Medication Sig Dispense Refill    oxybutynin (DITROPAN-XL) 10 MG extended release tablet Take 1 tablet by mouth daily 30 tablet 1    NIFEdipine (PROCARDIA XL) 30 MG extended release tablet Take 1 tablet by mouth daily 90 tablet 3    chlorthalidone (HYGROTON) 25 MG tablet Take 1 tablet by mouth daily 90 tablet 3    furosemide (LASIX) 40 MG tablet Take 1 tablet by mouth daily As needed for leg swelling (Patient not taking: Reported on 7/21/2021) 30 tablet 1    omeprazole (PRILOSEC) 20 MG delayed release capsule Take 1 capsule by mouth daily as needed (heartburn) (Patient not taking: Reported on 7/21/2021) 30 capsule 5     No current facility-administered medications for this visit. Social History:   Social History     Socioeconomic History    Marital status: Single     Spouse name: Not on file    Number of children: Not on file    Years of education: Not on file    Highest education level: Not on file   Occupational History    Not on file   Tobacco Use    Smoking status: Current Every Day Smoker     Packs/day: 1.00     Types: Cigarettes    Smokeless tobacco: Never Used   Vaping Use    Vaping Use: Never used   Substance and Sexual Activity    Alcohol use: Yes     Comment: occ    Drug use: Not on file    Sexual activity: Not Currently     Birth control/protection: Condom   Other Topics Concern    Not on file   Social History Narrative    Not on file     Social Determinants of Health     Financial Resource Strain: High Risk    Difficulty of Paying Living Expenses: Hard   Food Insecurity: No Food Insecurity    Worried About Running Out of Food in the Last Year: Never true    Joe of Food in the Last Year: Never true   Transportation Needs:     Lack of Transportation (Medical):      Lack of Transportation (Non-Medical):    Physical Activity:     Days of Exercise per Week:     Minutes of Exercise per Session:    Stress:     Nitrate, UA POC   Date Value Ref Range Status   07/21/2021 neg  Final     post void residual   Date Value Ref Range Status   07/21/2021 40 ml Final     FIRST SENSATION   Date Value Ref Range Status   07/21/2021 70 cc Final     EMPTY COUGH STRESS TEST   Date Value Ref Range Status   07/21/2021 negative  Final     FULL COUGH STRESS TEST   Date Value Ref Range Status   07/21/2021 not assessed  Final     SPASM   Date Value Ref Range Status   07/21/2021   Final    Positive-spasm at 70ml pushed fluid back up syringe        POPQ and Pelvic Exam:     Pelvic Organ Prolapse Quantification  Anterior Wall (Aa): -2   Anterior Wall (Ba): -2   Cervix or Cuff (C): -3     Genital Hiatus (gh): 2   Perineal Body (pb): 3   Total Vaginal Length (tvl): 8     Posterior Wall (Ap): -2   Posterior Wall (Bp): -2   Posterior Fornix (D): -7     Oxford Scale Muscle Strength: 4/5       Assessment/Plan:     Georgina Ornelas is a 42 y.o. female with   1. Urinary urgency    2. Urinary frequency    3. Urge incontinence      She is a very small bladder capacity with a bladder spasm today only 70 cc. She has agreed to try Ditropan 10 XL and to do timed voids. She will return for cystourethroscopy in approximately 6 weeks time. She was given handouts on her condition.   Orders Placed This Encounter   Procedures    Insert schneider catheter    POCT Urinalysis no Micro    Cystometrogram     Orders Placed This Encounter   Medications    oxybutynin (DITROPAN-XL) 10 MG extended release tablet     Sig: Take 1 tablet by mouth daily     Dispense:  30 tablet     Refill:  1       Freddie Bess MD

## 2021-10-13 ENCOUNTER — HOSPITAL ENCOUNTER (EMERGENCY)
Age: 41
Discharge: HOME | End: 2021-10-13
Payer: COMMERCIAL

## 2021-10-13 VITALS
OXYGEN SATURATION: 99 % | DIASTOLIC BLOOD PRESSURE: 74 MMHG | BODY MASS INDEX: 39.09 KG/M2 | RESPIRATION RATE: 18 BRPM | HEART RATE: 88 BPM | SYSTOLIC BLOOD PRESSURE: 172 MMHG | WEIGHT: 257.94 LBS | HEIGHT: 68 IN | TEMPERATURE: 97.5 F

## 2021-10-13 ASSESSMENT — PAIN DESCRIPTION - LOCATION: LOCATION: HEAD

## 2021-10-13 ASSESSMENT — PAIN SCALES - GENERAL: PAINLEVEL_OUTOF10: 6

## 2021-10-13 ASSESSMENT — PAIN DESCRIPTION - PAIN TYPE: TYPE: CHRONIC PAIN

## 2021-12-17 ENCOUNTER — APPOINTMENT (OUTPATIENT)
Dept: CT IMAGING | Age: 41
End: 2021-12-17
Payer: COMMERCIAL

## 2021-12-17 ENCOUNTER — HOSPITAL ENCOUNTER (EMERGENCY)
Age: 41
Discharge: HOME | End: 2021-12-17
Attending: STUDENT IN AN ORGANIZED HEALTH CARE EDUCATION/TRAINING PROGRAM
Payer: COMMERCIAL

## 2021-12-17 VITALS
SYSTOLIC BLOOD PRESSURE: 138 MMHG | WEIGHT: 252.21 LBS | TEMPERATURE: 97.5 F | OXYGEN SATURATION: 100 % | BODY MASS INDEX: 39.58 KG/M2 | HEART RATE: 80 BPM | DIASTOLIC BLOOD PRESSURE: 82 MMHG | HEIGHT: 67 IN | RESPIRATION RATE: 16 BRPM

## 2021-12-17 DIAGNOSIS — M54.2 NECK PAIN: Primary | ICD-10-CM

## 2021-12-17 PROCEDURE — 70450 CT HEAD/BRAIN W/O DYE: CPT

## 2021-12-17 PROCEDURE — 99283 EMERGENCY DEPT VISIT LOW MDM: CPT

## 2021-12-17 PROCEDURE — 6370000000 HC RX 637 (ALT 250 FOR IP): Performed by: STUDENT IN AN ORGANIZED HEALTH CARE EDUCATION/TRAINING PROGRAM

## 2021-12-17 PROCEDURE — 72125 CT NECK SPINE W/O DYE: CPT

## 2021-12-17 RX ORDER — ACETAMINOPHEN 325 MG/1
650 TABLET ORAL ONCE
Status: COMPLETED | OUTPATIENT
Start: 2021-12-17 | End: 2021-12-17

## 2021-12-17 RX ORDER — NAPROXEN 500 MG/1
500 TABLET ORAL 2 TIMES DAILY PRN
Qty: 30 TABLET | Refills: 5 | Status: SHIPPED | OUTPATIENT
Start: 2021-12-17 | End: 2022-01-27

## 2021-12-17 RX ORDER — CYCLOBENZAPRINE HCL 10 MG
10 TABLET ORAL 3 TIMES DAILY PRN
Qty: 30 TABLET | Refills: 0 | Status: SHIPPED | OUTPATIENT
Start: 2021-12-17 | End: 2021-12-27

## 2021-12-17 RX ADMIN — ACETAMINOPHEN 650 MG: 325 TABLET ORAL at 03:03

## 2021-12-17 ASSESSMENT — PAIN DESCRIPTION - FREQUENCY: FREQUENCY: CONTINUOUS

## 2021-12-17 ASSESSMENT — PAIN SCALES - GENERAL
PAINLEVEL_OUTOF10: 9
PAINLEVEL_OUTOF10: 9

## 2021-12-17 ASSESSMENT — PAIN DESCRIPTION - LOCATION: LOCATION: NECK;SHOULDER;HEAD

## 2021-12-17 ASSESSMENT — PAIN DESCRIPTION - ORIENTATION: ORIENTATION: RIGHT

## 2021-12-17 ASSESSMENT — PAIN DESCRIPTION - ONSET: ONSET: SUDDEN

## 2021-12-17 ASSESSMENT — PAIN DESCRIPTION - DESCRIPTORS: DESCRIPTORS: ACHING

## 2021-12-17 ASSESSMENT — PAIN DESCRIPTION - PROGRESSION: CLINICAL_PROGRESSION: GRADUALLY WORSENING

## 2021-12-17 NOTE — ED PROVIDER NOTES
Primary Care Physician: Cindy Chen MD   Attending Physician: No att. providers found     History   Chief Complaint   Patient presents with    Neck Pain     ceiling fell in her bedroom and hit her head and right shoulder         HPI   Luis Rudolph is a 42 y.o. female no significant medical history who presents this morning complaining of neck pain as well as head pain. Patient stated that her ceiling fell on her head. She did not pass out and no visual change but now complains of headaches. She also complains of neck pain. She believes that she has been exposed to asbestos. She was in good state of health before incident. History reviewed. No pertinent past medical history. History reviewed. No pertinent surgical history. History reviewed. No pertinent family history. Social History     Socioeconomic History    Marital status: Single     Spouse name: None    Number of children: None    Years of education: None    Highest education level: None   Occupational History    None   Tobacco Use    Smoking status: Current Every Day Smoker     Packs/day: 1.00     Types: Cigarettes    Smokeless tobacco: Never Used   Vaping Use    Vaping Use: Never used   Substance and Sexual Activity    Alcohol use: Yes     Comment: occ    Drug use: None    Sexual activity: Not Currently     Birth control/protection: Condom   Other Topics Concern    None   Social History Narrative    None     Social Determinants of Health     Financial Resource Strain: High Risk    Difficulty of Paying Living Expenses: Hard   Food Insecurity: No Food Insecurity    Worried About Running Out of Food in the Last Year: Never true    Joe of Food in the Last Year: Never true   Transportation Needs:     Lack of Transportation (Medical): Not on file    Lack of Transportation (Non-Medical):  Not on file   Physical Activity:     Days of Exercise per Week: Not on file    Minutes of Exercise per Session: Not on file   Stress:  Feeling of Stress : Not on file   Social Connections:     Frequency of Communication with Friends and Family: Not on file    Frequency of Social Gatherings with Friends and Family: Not on file    Attends Baptism Services: Not on file    Active Member of Clubs or Organizations: Not on file    Attends Club or Organization Meetings: Not on file    Marital Status: Not on file   Intimate Partner Violence:     Fear of Current or Ex-Partner: Not on file    Emotionally Abused: Not on file    Physically Abused: Not on file    Sexually Abused: Not on file   Housing Stability:     Unable to Pay for Housing in the Last Year: Not on file    Number of [place] in the Last Year: Not on file    Unstable Housing in the Last Year: Not on file        Review of Systems   10 total systems reviewed and found to be negative unless otherwise noted in HPI     Physical Exam   /82   Pulse 80   Temp 97.5 °F (36.4 °C) (Oral)   Resp 16   Ht 5' 7\" (1.702 m)   Wt 252 lb 3.3 oz (114.4 kg)   SpO2 100%   BMI 39.50 kg/m²      CONSTITUTIONAL: Well appearing, in no acute distress   HEAD: atraumatic, normocephalic   EYES: PERRL, No injection, discharge or scleral icterus. ENT: Moist mucous membranes. NECK: Normal ROM, NO LAD   CARDIOVASCULAR: Regular rate and rhythm. No murmurs or gallop. PULMONARY/CHEST: Airway patent. No retractions. Breath sounds clear with good air entry bilaterally. ABDOMEN: Soft, Non-distended and non-tender, without guarding or rebound. SKIN: Acyanotic, warm, dry   MUSCULOSKELETAL: No swelling, tenderness or deformity   NEUROLOGICAL: Awake and oriented x 3. Pulses intact. Grossly nonfocal   Nursing note and vitals reviewed. ED Course & Medical Decision Making   Medications   acetaminophen (TYLENOL) tablet 650 mg (650 mg Oral Given 12/17/21 0303)      Labs Reviewed - No data to display   CT Cervical Spine WO Contrast   Final Result   No acute intracranial abnormality.       No acute cervical spine abnormality. Minimal reversal of the normal cervical   lordosis can be seen in the setting of muscular strain. Empty sella noted. CT Head WO Contrast   Final Result   No acute intracranial abnormality. No acute cervical spine abnormality. Minimal reversal of the normal cervical   lordosis can be seen in the setting of muscular strain. Empty sella noted. CT Head WO Contrast    Result Date: 12/17/2021  EXAMINATION: CT OF THE CERVICAL SPINE WITHOUT CONTRAST; CT OF THE HEAD WITHOUT CONTRAST 12/17/2021 2:45 am TECHNIQUE: CT of the cervical spine was performed without the administration of intravenous contrast. Multiplanar reformatted images are provided for review. Dose modulation, iterative reconstruction, and/or weight based adjustment of the mA/kV was utilized to reduce the radiation dose to as low as reasonably achievable.; CT of the head was performed without the administration of intravenous contrast. Dose modulation, iterative reconstruction, and/or weight based adjustment of the mA/kV was utilized to reduce the radiation dose to as low as reasonably achievable. COMPARISON: None. HISTORY: ORDERING SYSTEM PROVIDED HISTORY: Object fell on the head TECHNOLOGIST PROVIDED HISTORY: Reason for exam:->Object fell on the head Decision Support Exception - unselect if not a suspected or confirmed emergency medical condition->Emergency Medical Condition (MA) Is the patient pregnant?->No Reason for Exam: Object fell on the head; ORDERING SYSTEM PROVIDED HISTORY: Object fell on the head TECHNOLOGIST PROVIDED HISTORY: Reason for exam:->Object fell on the head Has a \"code stroke\" or \"stroke alert\" been called? ->No Decision Support Exception - unselect if not a suspected or confirmed emergency medical condition->Emergency Medical Condition (MA) Is the patient pregnant?->No Reason for Exam: Object fell on the head FINDINGS: CT HEAD: BRAIN/VENTRICLES: There is no acute intracranial hemorrhage, mass effect or midline shift. No abnormal extra-axial fluid collection. The gray-white differentiation is maintained without evidence of an acute infarct. There is no evidence of hydrocephalus. No intracranial mass. No wedge-shaped area of acute ischemia is identified. No basilar cistern or sulcal effacement is identified. Empty sella. ORBITS: Orbits appear unremarkable. No acute abnormality. PARANASAL SINUSES: Sinus polyps are noted within the maxillary sinuses bilaterally. Mahsa bullosa are noted. No acute air-fluid level seen in the mastoid air cells or paranasal sinuses. SOFT TISSUE/CALVARIUM: No calvarial fracture is identified. No significant subcutaneous soft tissue hematoma is seen. CT CERVICAL SPINE: BONES/ALIGNMENT: The odontoid process appears intact. Occipital condyles and lateral masses of C1 are well aligned on C2. No vertebral body fracture or dislocation is identified. DEGENERATIVE CHANGES: No significant degenerative changes are identified. SOFT TISSUES: No prevertebral soft tissue swelling is identified. No paraspinal mass. No apical pneumothorax. No acute intracranial abnormality. No acute cervical spine abnormality. Minimal reversal of the normal cervical lordosis can be seen in the setting of muscular strain. Empty sella noted. CT Cervical Spine WO Contrast    Result Date: 12/17/2021  EXAMINATION: CT OF THE CERVICAL SPINE WITHOUT CONTRAST; CT OF THE HEAD WITHOUT CONTRAST 12/17/2021 2:45 am TECHNIQUE: CT of the cervical spine was performed without the administration of intravenous contrast. Multiplanar reformatted images are provided for review.  Dose modulation, iterative reconstruction, and/or weight based adjustment of the mA/kV was utilized to reduce the radiation dose to as low as reasonably achievable.; CT of the head was performed without the administration of intravenous contrast. Dose modulation, iterative reconstruction, and/or weight based adjustment of the mA/kV was utilized to reduce the radiation dose to as low as reasonably achievable. COMPARISON: None. HISTORY: ORDERING SYSTEM PROVIDED HISTORY: Object fell on the head TECHNOLOGIST PROVIDED HISTORY: Reason for exam:->Object fell on the head Decision Support Exception - unselect if not a suspected or confirmed emergency medical condition->Emergency Medical Condition (MA) Is the patient pregnant?->No Reason for Exam: Object fell on the head; ORDERING SYSTEM PROVIDED HISTORY: Object fell on the head TECHNOLOGIST PROVIDED HISTORY: Reason for exam:->Object fell on the head Has a \"code stroke\" or \"stroke alert\" been called? ->No Decision Support Exception - unselect if not a suspected or confirmed emergency medical condition->Emergency Medical Condition (MA) Is the patient pregnant?->No Reason for Exam: Object fell on the head FINDINGS: CT HEAD: BRAIN/VENTRICLES: There is no acute intracranial hemorrhage, mass effect or midline shift. No abnormal extra-axial fluid collection. The gray-white differentiation is maintained without evidence of an acute infarct. There is no evidence of hydrocephalus. No intracranial mass. No wedge-shaped area of acute ischemia is identified. No basilar cistern or sulcal effacement is identified. Empty sella. ORBITS: Orbits appear unremarkable. No acute abnormality. PARANASAL SINUSES: Sinus polyps are noted within the maxillary sinuses bilaterally. Mahsa bullosa are noted. No acute air-fluid level seen in the mastoid air cells or paranasal sinuses. SOFT TISSUE/CALVARIUM: No calvarial fracture is identified. No significant subcutaneous soft tissue hematoma is seen. CT CERVICAL SPINE: BONES/ALIGNMENT: The odontoid process appears intact. Occipital condyles and lateral masses of C1 are well aligned on C2. No vertebral body fracture or dislocation is identified. DEGENERATIVE CHANGES: No significant degenerative changes are identified.  SOFT TISSUES: No prevertebral soft tissue swelling is identified. No paraspinal mass. No apical pneumothorax. No acute intracranial abnormality. No acute cervical spine abnormality. Minimal reversal of the normal cervical lordosis can be seen in the setting of muscular strain. Empty sella noted. PROCEDURES:   Procedures    ASSESSMENT AND PLAN:  Alexa Hammond is a 42 y.o. female no significant medical history who presents this morning complaining of neck pain as well as head pain. Patient stated that her ceiling fell on her head. She did not pass out and no visual change but now complains of headaches. She also complains of neck pain. She believes that she has been exposed to asbestos. She was in good state of health before incident. Her exam is unremarkable apart from some tender to palpation on the neck. Given the mechanism of injury I did obtain a CT of the head and neck results are unremarkable. Because of her pain is probably secondary to contusion from the trauma. She is stable discharged home with recommendation to follow-up with her primary care doctor for further evaluation and treatment. ClINICAL IMPRESSION:  1.  Neck pain          PATIENT REFERRED TO:  Renaldo Paz MD  [address] [facility]  [phone]    Schedule an appointment as soon as possible for a visit in 2 days        DISCHARGE MEDICATIONS:  Discharge Medication List as of 12/17/2021  4:31 AM      START taking these medications    Details   cyclobenzaprine (FLEXERIL) 10 MG tablet Take 1 tablet by mouth 3 times daily as needed for Muscle spasms, Disp-30 tablet, R-0Print      naproxen (NAPROSYN) 500 MG tablet Take 1 tablet by mouth 2 times daily as needed for Pain, Disp-30 tablet, R-5Print           DISCONTINUED MEDICATIONS:  Discharge Medication List as of 12/17/2021  4:31 AM        DISPOSITION Decision To Discharge 12/17/2021 04:14:46 AM  -We have instructed the patient, Alexa Hammond) to return to the ED or call her PCP if her pain/symptoms worsen. -Findings and recommendations explained to patient. She expressed understanding and agreed with the plan. Michael Tillman MD (electronically signed)  12/17/2021  _________________________________________________________________________________________  _________________________________________________________________________________________  This record is transcribed utilizing voice recognition technology. There are inherent limitations in this technology. In addition, there may be limitations in editing of this report. If there are any discrepancies, please contact me directly.         Michael Tillman MD  12/17/21 0914

## 2021-12-17 NOTE — ED NOTES
Provider order placed for patient's discharge. Provider reviewed decision to discharge with the patient. Discharge paperwork and any prescriptions were reviewed with the patient. Patient verbalized understanding of discharge education and any prescriptions and has no further questions or further needs at this time. Patient left with all personal belongings and was stable upon departure. Patient thanked for choosing [facility] and informed to return should any need arise.        Ja Jones RN  12/17/21 1029

## 2022-01-10 DIAGNOSIS — I10 ESSENTIAL HYPERTENSION: ICD-10-CM

## 2022-01-10 RX ORDER — NIFEDIPINE 30 MG/1
TABLET, EXTENDED RELEASE ORAL
Qty: 90 TABLET | Refills: 3 | Status: SHIPPED | OUTPATIENT
Start: 2022-01-10

## 2022-01-10 RX ORDER — CHLORTHALIDONE 25 MG/1
TABLET ORAL
Qty: 90 TABLET | Refills: 3 | Status: SHIPPED | OUTPATIENT
Start: 2022-01-10

## 2022-01-10 NOTE — TELEPHONE ENCOUNTER
Medication:   Requested Prescriptions     Pending Prescriptions Disp Refills    chlorthalidone (HYGROTON) 25 MG tablet [Pharmacy Med Name: CHLORTHALIDONE 25 MG TABLET] 90 tablet 3     Sig: TAKE ONE TABLET BY MOUTH DAILY    NIFEdipine (PROCARDIA XL) 30 MG extended release tablet [Pharmacy Med Name: NIFEdipine ER 30 MG TAB, 24 HR] 90 tablet 3     Sig: TAKE ONE TABLET BY MOUTH DAILY        Last Filled:      Patient Phone Number: [phone] (home)     Last appt: 6/16/2021   Next appt: Visit date not found    Last OARRS: No flowsheet data found.

## 2022-01-27 ENCOUNTER — OFFICE VISIT (OUTPATIENT)
Dept: PRIMARY CARE CLINIC | Age: 42
End: 2022-01-27
Payer: COMMERCIAL

## 2022-01-27 VITALS
WEIGHT: 249 LBS | OXYGEN SATURATION: 98 % | HEIGHT: 67 IN | RESPIRATION RATE: 16 BRPM | HEART RATE: 75 BPM | TEMPERATURE: 97 F | BODY MASS INDEX: 39.08 KG/M2

## 2022-01-27 DIAGNOSIS — M79.18 MYOFASCIAL PAIN: ICD-10-CM

## 2022-01-27 DIAGNOSIS — S06.0X0D CONCUSSION WITHOUT LOSS OF CONSCIOUSNESS, SUBSEQUENT ENCOUNTER: Primary | ICD-10-CM

## 2022-01-27 DIAGNOSIS — R45.4 IRRITABILITY: ICD-10-CM

## 2022-01-27 PROCEDURE — G8417 CALC BMI ABV UP PARAM F/U: HCPCS | Performed by: INTERNAL MEDICINE

## 2022-01-27 PROCEDURE — G8427 DOCREV CUR MEDS BY ELIG CLIN: HCPCS | Performed by: INTERNAL MEDICINE

## 2022-01-27 PROCEDURE — G8484 FLU IMMUNIZE NO ADMIN: HCPCS | Performed by: INTERNAL MEDICINE

## 2022-01-27 PROCEDURE — 4004F PT TOBACCO SCREEN RCVD TLK: CPT | Performed by: INTERNAL MEDICINE

## 2022-01-27 PROCEDURE — 99213 OFFICE O/P EST LOW 20 MIN: CPT | Performed by: INTERNAL MEDICINE

## 2022-01-27 RX ORDER — TIZANIDINE 4 MG/1
4 TABLET ORAL DAILY PRN
Qty: 30 TABLET | Refills: 0 | Status: SHIPPED | OUTPATIENT
Start: 2022-01-27 | End: 2022-09-22

## 2022-01-27 RX ORDER — VENLAFAXINE HYDROCHLORIDE 37.5 MG/1
37.5 CAPSULE, EXTENDED RELEASE ORAL NIGHTLY
Qty: 30 CAPSULE | Refills: 3 | Status: SHIPPED | OUTPATIENT
Start: 2022-01-27 | End: 2022-09-22

## 2022-01-27 NOTE — LETTER
[facility]  [id number]  Phone: [phone]  Fax: [phone]    Brittaney Hodges MD        January 27, 2022     Patient: Deepak Valencia   YOB: 1980   Date of Visit: 1/27/2022       To Whom It May Concern: It is my medical opinion that Nabor Mccloud would strongly benefit from wearing a visor at work with the next month, to improve concentration and reduce headache, as she recuperates from her concussion. If you have any questions or concerns, please don't hesitate to call.     Sincerely,        Brittaney Hodges MD

## 2022-01-27 NOTE — PATIENT INSTRUCTIONS
1.  Zanaflex for muscle spasm pain prescription sent to pharmacy    2. Effexor 37.5 mg daily at bedtime for irritability sent to your pharmacy. 3.  Let me know in a couple of weeks how your irritability is doing    4. Continue to work with your chiropractor for myofascial release procedures to improve your neck and shoulder pain    5. Read the concussion pamphlet provided    6. Letter for visor to wear at work to reduce headache pain from the fluorescent lights. Shaded glasses would probably be less effective.

## 2022-01-27 NOTE — PROGRESS NOTES
2022    Leilani Merrill (:  1980) is a 42 y.o. female, here for evaluation of the following medical concerns:    No chief complaint on file. History of Present Illness  40-year-old -American female history of treated hypertension previously treated depression nicotine dependence GERD and recurrent herpes labialis prescribed chronic suppressive therapy, obesity BMI 39, mixed incontinence who comes in after being seen in the ED  for neck pain. Unfortunately a piece of ceiling in her bedroom fell and struck her head and right shoulder, with headache and neck pain since. No loss of consciousness visual changes, neuromusculoskeletal exam unremarkable, CT head and neck unremarkable, so dismissed home on Naprosyn and Flexeril. She for the 1st couple weeks did have some flashing lights which have since resolved. Fluorescent lights hurt her eye. She has noticed difficulty concentrating processing numbers intermittent headache and particularly increased irritability and \"brain fog. \"    She is sleeping fairly well, no recreational drug use at this time    She tells me her chiropractor is helping with her headache and myofascial neck and shoulder pain. She does have some loss of cervical lordosis. Review of Systems   Constitutional: Negative for activity change, appetite change, fatigue and unexpected weight change. HENT: Negative for dental problem, sinus pain, sore throat and trouble swallowing. Eyes: Negative for pain and visual disturbance. Respiratory: Negative for apnea, cough, chest tightness, shortness of breath and wheezing. Cardiovascular: Negative for chest pain and palpitations. Gastrointestinal: Negative for abdominal pain, blood in stool, constipation, diarrhea, nausea, rectal pain and vomiting. Endocrine: Negative for cold intolerance, heat intolerance, polydipsia, polyphagia and polyuria.    Genitourinary: Negative for difficulty urinating, dysuria, flank pain, frequency, hematuria, pelvic pain, urgency, vaginal bleeding and vaginal discharge. Musculoskeletal: Negative for arthralgias, back pain, gait problem, joint swelling, myalgias, neck pain and neck stiffness. Skin: Negative for color change and rash. Neurological: Negative for dizziness, tremors, syncope, speech difficulty, weakness, light-headedness and headaches. Hematological: Negative for adenopathy. Does not bruise/bleed easily. Psychiatric/Behavioral: Negative for agitation, behavioral problems, decreased concentration, sleep disturbance and suicidal ideas. The patient is not nervous/anxious and is not hyperactive. Prior to Visit Medications    Medication Sig Taking? Authorizing Provider   chlorthalidone (HYGROTON) 25 MG tablet TAKE ONE TABLET BY MOUTH DAILY  Tong Rodriguez MD   NIFEdipine (PROCARDIA XL) 30 MG extended release tablet TAKE ONE TABLET BY MOUTH DAILY  Tong Rodriguez MD   naproxen (NAPROSYN) 500 MG tablet Take 1 tablet by mouth 2 times daily as needed for Pain  Nikotojaci Turner MD   oxybutynin (DITROPAN-XL) 10 MG extended release tablet Take 1 tablet by mouth daily  Marissa Chavez MD   furosemide (LASIX) 40 MG tablet Take 1 tablet by mouth daily As needed for leg swelling  Patient not taking: Reported on 7/21/2021  Tong Rodriguez MD   omeprazole (PRILOSEC) 20 MG delayed release capsule Take 1 capsule by mouth daily as needed (heartburn)  Patient not taking: Reported on 5/53/1656  Luisa Larry MD        No Known Allergies    No past medical history on file. Hypertension, 2019. Herpes labialis, 2019  No past surgical history on file.     Social History     Socioeconomic History    Marital status: Single     Spouse name: Not on file    Number of children: Not on file    Years of education: Not on file    Highest education level: Not on file   Occupational History    Not on file   Tobacco Use    Smoking status: Current Every Day Smoker     Packs/day: 1.00     Types: Cigarettes    Smokeless tobacco: Never Used   Vaping Use    Vaping Use: Never used   Substance and Sexual Activity    Alcohol use: Yes     Comment: occ    Drug use: Not on file    Sexual activity: Not Currently     Birth control/protection: Condom   Other Topics Concern    Not on file   Social History Narrative    Not on file     Social Determinants of Health     Financial Resource Strain: High Risk    Difficulty of Paying Living Expenses: Hard   Food Insecurity: No Food Insecurity    Worried About Running Out of Food in the Last Year: Never true    Joe of Food in the Last Year: Never true   Transportation Needs:     Lack of Transportation (Medical): Not on file    Lack of Transportation (Non-Medical): Not on file   Physical Activity:     Days of Exercise per Week: Not on file    Minutes of Exercise per Session: Not on file   Stress:     Feeling of Stress : Not on file   Social Connections:     Frequency of Communication with Friends and Family: Not on file    Frequency of Social Gatherings with Friends and Family: Not on file    Attends Temple Services: Not on file    Active Member of [address] [place] [address] or Organizations: Not on file    Attends Club or Organization Meetings: Not on file    Marital Status: Not on file   Intimate Partner Violence:     Fear of Current or Ex-Partner: Not on file    Emotionally Abused: Not on file    Physically Abused: Not on file    Sexually Abused: Not on file   Housing Stability:     Unable to Pay for Housing in the Last Year: Not on file    Number of [place] in the Last Year: Not on file    Unstable Housing in the Last Year: Not on file        No family history on file. There were no vitals filed for this visit. Estimated body mass index is 39.5 kg/m² as calculated from the following:    Height as of 12/17/21: 5' 7\" (1.702 m). Weight as of 12/17/21: 252 lb 3.3 oz (114.4 kg).     PHYSICAL EXAM  GENERAL:  Pleasant  female who looks her stated age, awake alert and oriented x3, no acute distress. Neuro: Cranial nerves II through XII grossly intact. Normal gait and station. No resting tremor. Neck: Substantial myofascial muscle spasm limiting sidebending rotation. EXTREMITIES: No lower extremity edema currently. PSYCH: Quite animated, good eye contact. Unrestricted affect range. Mood congruent with affect. Linear thought. LABS  Lab Review   Office Visit on 07/21/2021   Component Date Value    wbc urine, poc 07/21/2021 neg     rbc urine, poc 07/21/2021 neg     Nitrate, UA POC 07/21/2021 neg     post void residual 07/21/2021 40     FIRST SENSATION 07/21/2021 70     EMPTY COUGH STRESS TEST 07/21/2021 negative     FULL COUGH STRESS TEST 07/21/2021 not assessed     SPASM 07/21/2021 Positive-spasm at 70ml pushed fluid back up syringe    Office Visit on 07/07/2021   Component Date Value    SARS-CoV-2 07/07/2021 Not Detected    Office Visit on 06/04/2021   Component Date Value    SARS-CoV-2, PCR 06/04/2021 Not Detected          ASSESSMENT/PLAN  1. Morbidly obese ([facility] [address]  Patient was 276 in 2020, down to 252, nearly 25 pounds. Congratulated her on this continued trend. 2. Essential hypertension  Wonder about sleep apnea. Up to date electrolytes neuroid normal last year. Asked patient to get home cuff bring record to her follow-up visit, she elected not to do so. She elected not to monitor her blood pressures outpatient though not too bad today here in the clinic on dual chlorthalidone nifedipine. 3.  Concussion. No loss of consciousness. No interval progression to suggest neurologic deterioration requiring reimaging. For irritability we will treat with Effexor, for myofascial muscle spasm we will treat with as needed Zanaflex. I had like her to wear a visor at work to reduce fluorescent light driven headache.     She will let me know in a couple weeks how the Effexor is helping, potential to increase to 75 mg at night, and see her back in 6 weeks. 4. Gastroesophageal reflux disease without esophagitis  No red flags. Previously discussed Pepcid if Tums was inadequate as a safe alternative to Prilosec. 5. Depression, major, single episode, mild ([address]  She agrees that anxiety is often troubles her and we educated her on Wellbutrin not being abortive therapy. Prescribed BuSpar to try as needed. 6. Tobacco abuse  Precontemplative. 7 herpes labialis. Recommended abortive therapy only, 2 g twice daily for total of 2 doses at first sign of herpes. Prescription provided. 8.  Health maintenance issues. In 2020 ordered lipid profile, electrolytes, glucose, TSH which patient has chosen not to complete. Rest at follow-up. Patient gets Pap smears every year or 2 at [facility]. Presume they will coordinate/discuss mammogram.    9.  Mixed incontinence. Kegel exercises reinforced for stress incontinence, will rule out UTI just to be sure despite lack of dysuria, will see if insurance will cover mirabegron. Otherwise we can try Vesicare, oxybutynin, patient educated regarding potential side effects dry mouth constipation. Prior referral to urogynecology sent exclude cystocele, overflow incontinence (though unlikely), other therapies should pharmacotherapy prove too expensive or ineffective. No follow-ups on file.       Brissa Chadwick MD   Time 23 minutes

## 2022-09-22 ENCOUNTER — OFFICE VISIT (OUTPATIENT)
Dept: PRIMARY CARE CLINIC | Age: 42
End: 2022-09-22
Payer: COMMERCIAL

## 2022-09-22 VITALS
DIASTOLIC BLOOD PRESSURE: 92 MMHG | BODY MASS INDEX: 39.47 KG/M2 | SYSTOLIC BLOOD PRESSURE: 139 MMHG | TEMPERATURE: 97 F | HEART RATE: 71 BPM | OXYGEN SATURATION: 97 % | WEIGHT: 252 LBS

## 2022-09-22 DIAGNOSIS — Z72.0 TOBACCO ABUSE: Primary | ICD-10-CM

## 2022-09-22 DIAGNOSIS — R53.81 MALAISE: ICD-10-CM

## 2022-09-22 DIAGNOSIS — Z00.00 ROUTINE ADULT HEALTH MAINTENANCE: ICD-10-CM

## 2022-09-22 DIAGNOSIS — R53.83 OTHER FATIGUE: ICD-10-CM

## 2022-09-22 DIAGNOSIS — I10 PRIMARY HYPERTENSION: ICD-10-CM

## 2022-09-22 PROCEDURE — 99214 OFFICE O/P EST MOD 30 MIN: CPT | Performed by: INTERNAL MEDICINE

## 2022-09-22 PROCEDURE — G8417 CALC BMI ABV UP PARAM F/U: HCPCS | Performed by: INTERNAL MEDICINE

## 2022-09-22 PROCEDURE — G8427 DOCREV CUR MEDS BY ELIG CLIN: HCPCS | Performed by: INTERNAL MEDICINE

## 2022-09-22 PROCEDURE — 4004F PT TOBACCO SCREEN RCVD TLK: CPT | Performed by: INTERNAL MEDICINE

## 2022-09-22 SDOH — ECONOMIC STABILITY: FOOD INSECURITY: WITHIN THE PAST 12 MONTHS, THE FOOD YOU BOUGHT JUST DIDN'T LAST AND YOU DIDN'T HAVE MONEY TO GET MORE.: NEVER TRUE

## 2022-09-22 SDOH — ECONOMIC STABILITY: FOOD INSECURITY: WITHIN THE PAST 12 MONTHS, YOU WORRIED THAT YOUR FOOD WOULD RUN OUT BEFORE YOU GOT MONEY TO BUY MORE.: NEVER TRUE

## 2022-09-22 ASSESSMENT — SOCIAL DETERMINANTS OF HEALTH (SDOH): HOW HARD IS IT FOR YOU TO PAY FOR THE VERY BASICS LIKE FOOD, HOUSING, MEDICAL CARE, AND HEATING?: NOT HARD AT ALL

## 2022-09-22 NOTE — PROGRESS NOTES
change and rash. Neurological: Negative for dizziness, tremors, syncope, speech difficulty, weakness, light-headedness and headaches. Hematological: Negative for adenopathy. Does not bruise/bleed easily. Psychiatric/Behavioral: Negative for agitation, behavioral problems, decreased concentration, sleep disturbance and suicidal ideas. The patient is not nervous/anxious and is not hyperactive. Prior to Visit Medications    Medication Sig Taking? Authorizing Provider   venlafaxine (EFFEXOR XR) 37.5 MG extended release capsule Take 1 capsule by mouth nightly Yes Severo Garcia MD   tiZANidine (ZANAFLEX) 4 MG tablet Take 1 tablet by mouth daily as needed (Muscle spasm pain) Yes Severo Garcia MD   chlorthalidone (HYGROTON) 25 MG tablet TAKE ONE TABLET BY MOUTH DAILY Yes Severo Garcia MD   NIFEdipine (PROCARDIA XL) 30 MG extended release tablet TAKE ONE TABLET BY MOUTH DAILY Yes Severo Garcia MD        No Known Allergies    No past medical history on file. Hypertension, 2019. Herpes labialis, 2019  No past surgical history on file.     Social History     Socioeconomic History    Marital status: Single     Spouse name: Not on file    Number of children: Not on file    Years of education: Not on file    Highest education level: Not on file   Occupational History    Not on file   Tobacco Use    Smoking status: Every Day     Packs/day: 1.00     Types: Cigarettes    Smokeless tobacco: Never   Vaping Use    Vaping Use: Never used   Substance and Sexual Activity    Alcohol use: Yes     Comment: occ    Drug use: Not on file    Sexual activity: Not Currently     Birth control/protection: Condom   Other Topics Concern    Not on file   Social History Narrative    Not on file     Social Determinants of Health     Financial Resource Strain: Low Risk     Difficulty of Paying Living Expenses: Not hard at all   Food Insecurity: No Food Insecurity    Worried About [address] [place] in the Last Year: Never true    Ran Out of Food in the Last Year: Never true   Transportation Needs: Not on file   Physical Activity: Not on file   Stress: Not on file   Social Connections: Not on file   Intimate Partner Violence: Not on file   Housing Stability: Not on file        No family history on file. Vitals:    09/22/22 1616   BP: (!) 139/92   Pulse: 71   Temp: 97 °F (36.1 °C)   TempSrc: Temporal   SpO2: 97%   Weight: 252 lb (114.3 kg)     Estimated body mass index is 39.47 kg/m² as calculated from the following:    Height as of 1/27/22: 5' 7\" (1.702 m). Weight as of this encounter: 252 lb (114.3 kg). PHYSICAL EXAM  GENERAL:  Pleasant  female who looks her stated age, awake alert and oriented x3, no acute distress. Vague historian. PSYCH: Animated, rather tangential without pressured speech. LABS  Lab Review   No visits with results within 14 Month(s) from this visit. Latest known visit with results is:   Office Visit on 07/21/2021   Component Date Value    WBC Urine, POC 07/21/2021 neg     RBC Urine, POC 07/21/2021 neg     Nitrate, UA POC 07/21/2021 neg     post void residual 07/21/2021 40     FIRST SENSATION 07/21/2021 70     EMPTY COUGH STRESS TEST 07/21/2021 negative     FULL COUGH STRESS TEST 07/21/2021 not assessed     SPASM 07/21/2021 Positive-spasm at 70ml pushed fluid back up syringe          ASSESSMENT/PLAN  1. Morbidly obese ([address]  Patient was 276 in 2020, down to 252, nearly 25 pounds. Congratulated her on this continued trend. She expresses interest in phentermine, seeing a weight loss specialist.  I stressed that we need her blood pressure controlled before starting phentermine. 2. Essential hypertension  Wonder about sleep apnea. Up to date electrolytes neuroid normal last year. Asked patient to get home cuff bring record to her follow-up visit, she elected not to do so.   I stressed the potential impact of untreated sleep apnea, and asked her to ask anyone who seen her sleep whether she she would do some screening labs, monitor her blood pressure on medication and bring to follow-up appointment ideally in a couple of weeks. Hopefully she will be able to do this. No follow-ups on file.   Time 30-minute    Magali Black MD   Time 23 minutes

## 2023-02-02 ENCOUNTER — TELEPHONE (OUTPATIENT)
Dept: PRIMARY CARE CLINIC | Age: 43
End: 2023-02-02

## 2023-02-02 DIAGNOSIS — I10 ESSENTIAL HYPERTENSION: ICD-10-CM

## 2023-02-02 RX ORDER — CHLORTHALIDONE 25 MG/1
TABLET ORAL
Qty: 30 TABLET | Refills: 0 | Status: SHIPPED | OUTPATIENT
Start: 2023-02-02

## 2023-02-02 RX ORDER — CHLORTHALIDONE 25 MG/1
TABLET ORAL
Qty: 90 TABLET | Refills: 3 | Status: CANCELLED | OUTPATIENT
Start: 2023-02-02

## 2023-02-02 RX ORDER — NIFEDIPINE 30 MG/1
30 TABLET, EXTENDED RELEASE ORAL DAILY
Qty: 30 TABLET | Refills: 0 | Status: SHIPPED | OUTPATIENT
Start: 2023-02-02

## 2023-02-02 RX ORDER — NIFEDIPINE 30 MG/1
TABLET, EXTENDED RELEASE ORAL
Qty: 90 TABLET | Refills: 3 | Status: CANCELLED | OUTPATIENT
Start: 2023-02-02

## 2023-02-02 NOTE — TELEPHONE ENCOUNTER
Patient called in for a refill on   NIFEdipine (PROCARDIA XL) 30 MG extended release tablet     chlorthalidone (HYGROTON) 25 MG tablet       I advised pt to make an appointment to establish care with a provider and she refused. Stated that she has had medication refilled without making an appointment when switching doctors.        Please advise  [phone]

## 2023-02-02 NOTE — TELEPHONE ENCOUNTER
Called  and spoke with pt making her aware that she need to establish care with Dr. Christianson Files. I made pt aware that she will probably refill for 30 days but after that she will need to be seen. Pt has not had blood work done in 2 years.  Pt scheduled for 02/14/2023 at 10:20 am. Please advise

## 2023-02-14 ENCOUNTER — OFFICE VISIT (OUTPATIENT)
Dept: PRIMARY CARE CLINIC | Age: 43
End: 2023-02-14

## 2023-02-14 VITALS
HEART RATE: 79 BPM | WEIGHT: 253.4 LBS | DIASTOLIC BLOOD PRESSURE: 82 MMHG | SYSTOLIC BLOOD PRESSURE: 120 MMHG | BODY MASS INDEX: 39.77 KG/M2 | HEIGHT: 67 IN

## 2023-02-14 DIAGNOSIS — D17.1 LIPOMA OF TORSO: ICD-10-CM

## 2023-02-14 DIAGNOSIS — L85.3 DRY SKIN DERMATITIS: ICD-10-CM

## 2023-02-14 DIAGNOSIS — E66.01 CLASS 2 SEVERE OBESITY DUE TO EXCESS CALORIES WITH SERIOUS COMORBIDITY IN ADULT, UNSPECIFIED BMI (HCC): ICD-10-CM

## 2023-02-14 DIAGNOSIS — Z01.419 WELL WOMAN EXAM: ICD-10-CM

## 2023-02-14 DIAGNOSIS — Z00.00 ENCOUNTER FOR PREVENTATIVE ADULT HEALTH CARE EXAMINATION: Primary | ICD-10-CM

## 2023-02-14 DIAGNOSIS — R01.1 HEART MURMUR: ICD-10-CM

## 2023-02-14 DIAGNOSIS — E66.9 OBESITY (BMI 30.0-34.9): ICD-10-CM

## 2023-02-14 DIAGNOSIS — E55.9 VITAMIN D DEFICIENCY: ICD-10-CM

## 2023-02-14 DIAGNOSIS — R00.2 PALPITATION: ICD-10-CM

## 2023-02-14 DIAGNOSIS — I10 ESSENTIAL HYPERTENSION: ICD-10-CM

## 2023-02-14 DIAGNOSIS — Z11.59 ENCOUNTER FOR HEPATITIS C SCREENING TEST FOR LOW RISK PATIENT: ICD-10-CM

## 2023-02-14 RX ORDER — CLOTRIMAZOLE AND BETAMETHASONE DIPROPIONATE 10; .64 MG/G; MG/G
CREAM TOPICAL
Qty: 45 G | Refills: 3 | Status: SHIPPED | OUTPATIENT
Start: 2023-02-14

## 2023-02-14 RX ORDER — CHLORTHALIDONE 25 MG/1
TABLET ORAL
Qty: 90 TABLET | Refills: 3 | Status: SHIPPED | OUTPATIENT
Start: 2023-02-14

## 2023-02-14 RX ORDER — NIFEDIPINE 30 MG/1
30 TABLET, EXTENDED RELEASE ORAL DAILY
Qty: 90 TABLET | Refills: 3 | Status: SHIPPED | OUTPATIENT
Start: 2023-02-14

## 2023-02-14 ASSESSMENT — PATIENT HEALTH QUESTIONNAIRE - PHQ9
SUM OF ALL RESPONSES TO PHQ QUESTIONS 1-9: 0
1. LITTLE INTEREST OR PLEASURE IN DOING THINGS: 0
2. FEELING DOWN, DEPRESSED OR HOPELESS: 0
SUM OF ALL RESPONSES TO PHQ9 QUESTIONS 1 & 2: 0
SUM OF ALL RESPONSES TO PHQ QUESTIONS 1-9: 0

## 2023-02-14 ASSESSMENT — ENCOUNTER SYMPTOMS
RESPIRATORY NEGATIVE: 1
GASTROINTESTINAL NEGATIVE: 1
ALLERGIC/IMMUNOLOGIC NEGATIVE: 1
EYES NEGATIVE: 1

## 2023-02-14 NOTE — PROGRESS NOTES
2023    Ivania Jenkins (:  1980) is a 42 y.o. female, here for a preventive medicine evaluation.    Patient Active Problem List   Diagnosis    Essential hypertension    Gastroesophageal reflux disease without esophagitis    Tobacco abuse    Heart murmur    Morbidly obese (HCC)       Review of Systems   Constitutional: Negative.    HENT: Negative.     Eyes: Negative.         Feels like needs readers   Respiratory: Negative.     Cardiovascular:  Positive for palpitations. Negative for chest pain and leg swelling.   Gastrointestinal: Negative.    Endocrine: Negative.    Genitourinary: Negative.    Musculoskeletal: Negative.    Skin: Negative.    Allergic/Immunologic: Negative.    Neurological: Negative.    Hematological: Negative.    Psychiatric/Behavioral: Negative.       Prior to Visit Medications    Medication Sig Taking? Authorizing Provider   clotrimazole-betamethasone (LOTRISONE) 1-0.05 % cream Apply topically 2 times daily. Yes Kimberly Rodriguez MD   chlorthalidone (HYGROTON) 25 MG tablet TAKE ONE TABLET BY MOUTH DAILY needs apt before further refills. Yes Kimberly Rodriguez MD   NIFEdipine (PROCARDIA XL) 30 MG extended release tablet Take 1 tablet by mouth daily Needs apt before refills. Yes Kimberly Rodriguez MD        No Known Allergies    History reviewed. No pertinent past medical history.    History reviewed. No pertinent surgical history.    Social History     Socioeconomic History    Marital status: Single     Spouse name: Not on file    Number of children: Not on file    Years of education: Not on file    Highest education level: Not on file   Occupational History    Not on file   Tobacco Use    Smoking status: Every Day     Packs/day: 1.00     Types: Cigarettes    Smokeless tobacco: Never   Vaping Use    Vaping Use: Never used   Substance and Sexual Activity    Alcohol use: Yes     Comment: occ    Drug use: Not on file    Sexual activity: Not Currently     Birth  control/protection: Condom   Other Topics Concern    Not on file   Social History Narrative    Not on file     Social Determinants of Health     Financial Resource Strain: Low Risk     Difficulty of Paying Living Expenses: Not hard at all   Food Insecurity: No Food Insecurity    Worried About Running Out of Food in the Last Year: Never true    Ran Out of Food in the Last Year: Never true   Transportation Needs: Not on file   Physical Activity: Not on file   Stress: Not on file   Social Connections: Not on file   Intimate Partner Violence: Not on file   Housing Stability: Not on file        Family History   Problem Relation Age of Onset    Heart Failure Paternal Grandfather     Kidney Disease Paternal Grandfather         needed dialysis and refused. ADVANCE DIRECTIVE: N, <no information>    Vitals:    02/14/23 1039   BP: 120/82   Pulse: 79   Weight: 253 lb 6.4 oz (114.9 kg)   Height: 5' 7\" (1.702 m)     Estimated body mass index is 39.69 kg/m² as calculated from the following:    Height as of this encounter: 5' 7\" (1.702 m). Weight as of this encounter: 253 lb 6.4 oz (114.9 kg). Physical Exam  Constitutional:       Appearance: Normal appearance. HENT:      Head: Normocephalic and atraumatic. Right Ear: Tympanic membrane normal.      Left Ear: Tympanic membrane normal.      Nose: Nose normal.   Eyes:      General:         Right eye: No discharge. Left eye: No discharge. Extraocular Movements: Extraocular movements intact. Conjunctiva/sclera: Conjunctivae normal.      Pupils: Pupils are equal, round, and reactive to light. Cardiovascular:      Rate and Rhythm: Normal rate and regular rhythm. Pulses: Normal pulses. Heart sounds: Normal heart sounds. Pulmonary:      Effort: Pulmonary effort is normal.      Breath sounds: Normal breath sounds. Abdominal:      General: Abdomen is flat. Bowel sounds are normal. There is no distension. Palpations: Abdomen is soft. There is no mass. Tenderness: There is no abdominal tenderness. There is no guarding or rebound. Hernia: No hernia is present. Musculoskeletal:      Cervical back: Normal range of motion. Right lower leg: No edema. Left lower leg: Edema present. Skin:     General: Skin is warm and dry. Neurological:      General: No focal deficit present. Mental Status: She is alert and oriented to person, place, and time. Cranial Nerves: No cranial nerve deficit. Sensory: No sensory deficit. Motor: No weakness. Coordination: Coordination normal.      Gait: Gait normal.   Psychiatric:         Mood and Affect: Mood normal.         Behavior: Behavior normal.         Thought Content: Thought content normal.         Judgment: Judgment normal.       No flowsheet data found.     Lab Results   Component Value Date/Time    CHOL 208 02/14/2023 01:33 PM    CHOL 195 12/03/2019 10:38 AM    TRIG 131 02/14/2023 01:33 PM    TRIG 115 12/03/2019 10:38 AM    HDL 60 02/14/2023 01:33 PM    HDL 63 12/03/2019 10:38 AM    LDLCALC 122 02/14/2023 01:33 PM    LDLCALC 109 12/03/2019 10:38 AM    GLUCOSE 102 02/14/2023 01:33 PM    LABA1C 5.5 02/14/2023 01:33 PM    LABA1C 5.5 12/03/2019 10:38 AM       The 10-year ASCVD risk score (Chucky MCLEOD, et al., 2019) is: 2.4%    Values used to calculate the score:      Age: 36 years      Sex: Female      Is Non- : Yes      Diabetic: No      Tobacco smoker: Yes      Systolic Blood Pressure: 524 mmHg      Is BP treated: Yes      HDL Cholesterol: 60 mg/dL      Total Cholesterol: 208 mg/dL    Immunization History   Administered Date(s) Administered    COVID-19, PFIZER GRAY top, DO NOT Dilute, (age 15 y+), IM, 30 mcg/0.3 mL 09/06/2022    COVID-19, PFIZER PURPLE top, DILUTE for use, (age 15 y+), 30mcg/0.3mL 08/15/2022    Influenza, FLUARIX, FLULAVAL, FLUZONE (age 10 mo+) AND AFLURIA, (age 1 y+), PF, 0.5mL 11/19/2020    MMR 08/11/2022    Pneumococcal Polysaccharide (Edpdfrltk08) 12/03/2019    Rubella 08/28/2007    Tdap (Boostrix, Adacel) 12/03/2019       Health Maintenance   Topic Date Due    Cervical cancer screen  Never done    COVID-19 Vaccine (3 - Booster for Pfizer series) 11/01/2022    Flu vaccine (1) 02/14/2024 (Originally 8/1/2022)    Depression Screen  02/14/2024    Diabetes screen  02/14/2026    Lipids  02/14/2028    DTaP/Tdap/Td vaccine (2 - Td or Tdap) 12/03/2029    Pneumococcal 0-64 years Vaccine  Completed    Hepatitis C screen  Completed    HIV screen  Completed    Hepatitis A vaccine  Aged Out    Hib vaccine  Aged Out    Meningococcal (ACWY) vaccine  Aged Out       Assessment & Plan   Encounter for preventative adult health care examination completed. See below orders.  -     TSH with Reflex to FT4; Future  -     Comprehensive Metabolic Panel; Future  -     Hemoglobin A1C; Future  -     CBC with Auto Differential; Future  -     Urinalysis with Microscopic; Future  -     Lipid Panel; Future  -     AFL - Sunday MD Jos, Ophthalmology, [facility]  Well woman exam: Needed referral given. -     AFL - Heber Faulkner MD, Gynecology, [facility]  Encounter for hepatitis C screening test for low risk patient, will screen because there is a cure. -     Hepatitis C Antibody; Future  Essential hypertension controlled with current medications, continue. Monitor renal function and electrolytes. -     chlorthalidone (HYGROTON) 25 MG tablet; TAKE ONE TABLET BY MOUTH DAILY needs apt before further refills. , Disp-90 tablet, R-3Normal  -     NIFEdipine (PROCARDIA XL) 30 MG extended release tablet; Take 1 tablet by mouth daily Needs apt before refills. , Disp-90 tablet, R-3Normal  Vitamin D deficiency, high risk for deficiency living in the [place] so we will check level. -     Vitamin D 25 Hydroxy; Future  Dry skin dermatitis continue to moisturize feet with Eucerin Cream daily and apply Lotrisone cream to the scaly areas.   - clotrimazole-betamethasone (LOTRISONE) 1-0.05 % cream; Apply topically 2 times daily. , Disp-45 g, R-3, Normal  Heart murmur: No CHF on exam or orthopnea but will get echo to further evaluate. -     ECHO 2D WO Color Doppler Complete; Future  Palpitation, frequently, not taking any stimulants over-the-counter so limit caffeine to 1 cup a day and will further evaluate with a ZIO monitor.  -     Longterm Continuous Cardiac Event Monitor (ZIO); Future  Lipoma of torso, refer to surgeon to remove. Large lipoma of midback.    -     Petty Faye MD, General Surgery, [facility]  BMI of 39 and class II obesity with comorbid conditions of hypertension start low-carb diet daily exercise and reassess in 6 weeks. Return in about 6 weeks (around 3/28/2023).          --Lisbeth Maldonado MD

## 2023-02-17 PROBLEM — R00.2 PALPITATION: Status: ACTIVE | Noted: 2023-02-17

## 2023-02-17 PROBLEM — D17.1 LIPOMA OF TORSO: Status: ACTIVE | Noted: 2023-02-17

## 2023-03-09 ENCOUNTER — INITIAL CONSULT (OUTPATIENT)
Dept: SURGERY | Age: 43
End: 2023-03-09
Payer: COMMERCIAL

## 2023-03-09 DIAGNOSIS — D17.1 LIPOMA OF BACK: Primary | ICD-10-CM

## 2023-03-09 PROCEDURE — G8417 CALC BMI ABV UP PARAM F/U: HCPCS | Performed by: SURGERY

## 2023-03-09 PROCEDURE — 99243 OFF/OP CNSLTJ NEW/EST LOW 30: CPT | Performed by: SURGERY

## 2023-03-09 PROCEDURE — G8484 FLU IMMUNIZE NO ADMIN: HCPCS | Performed by: SURGERY

## 2023-03-09 PROCEDURE — G8427 DOCREV CUR MEDS BY ELIG CLIN: HCPCS | Performed by: SURGERY

## 2023-03-09 NOTE — PATIENT INSTRUCTIONS
Surgeon: Deniz Todd MD  Your surgery will be at [facility] [place]  First floor of the [address] [place] [address]. [address] [place] [address]    Date:    Arrival time:    Surgery time:     (   ) Outpatient with IV sedation     Nothing to eat or drink after midnight the night before. (This includes water)     You will need to bring a photo ID and insurance card    Please contact pre-admission testing if you have any further questions. [phone]    Please contact Sabrina if you need to reschedule your surgery.    Office phone number:     [phone]  Fax number for [facility]:    [phone]

## 2023-03-09 NOTE — PROGRESS NOTES
New Patient [address] General and Vascular Surgery   Jenise Maravilla MD    [address], [address]  [place]  Phone: [phone]  Fax: [phone]    Dianne Ruggiero   YOB: 1980    Date of Visit:  3/9/2023    Satya Tesfaye MD    HPI:   Lipoma: Dianne Ruggiero presents for evaluation of a lipoma as a referral from [facility] MD GEOVANNY. Lesion is located in the mid back. Onset was 18 years ago. Symptoms have gradually worsened. Initially, she had some pain with it as it enlarged. She states that her pain has improved but now it has enlarged to the point of protruding through clothing where people can see it. No redness or drainage. Never had anything like this before. No Known Allergies  No outpatient medications have been marked as taking for the 3/9/23 encounter (Initial consult) with Katerina Marie MD.       History reviewed. No pertinent past medical history. History reviewed. No pertinent surgical history. Family History   Problem Relation Age of Onset    Heart Failure Paternal Grandfather     Kidney Disease Paternal Grandfather         needed dialysis and refused.      Social History     Socioeconomic History    Marital status: Single     Spouse name: Not on file    Number of children: Not on file    Years of education: Not on file    Highest education level: Not on file   Occupational History    Not on file   Tobacco Use    Smoking status: Every Day     Packs/day: 1.00     Types: Cigarettes    Smokeless tobacco: Never   Vaping Use    Vaping Use: Never used   Substance and Sexual Activity    Alcohol use: Yes     Comment: occ    Drug use: Not on file    Sexual activity: Not Currently     Birth control/protection: Condom   Other Topics Concern    Not on file   Social History Narrative    Not on file     Social Determinants of Health     Financial Resource Strain: Low Risk     Difficulty of Paying Living Expenses: Not hard at all   Food Insecurity: No Food Insecurity    Worried About Running Out of Food in the Last Year: Never true    Ran Out of Food in the Last Year: Never true   Transportation Needs: Not on file   Physical Activity: Not on file   Stress: Not on file   Social Connections: Not on file   Intimate Partner Violence: Not on file   Housing Stability: Not on file          There were no vitals filed for this visit. There is no height or weight on file to calculate BMI. Wt Readings from Last 3 Encounters:   02/14/23 253 lb 6.4 oz (114.9 kg)   09/22/22 252 lb (114.3 kg)   01/27/22 249 lb (112.9 kg)     BP Readings from Last 3 Encounters:   02/14/23 120/82   09/22/22 (!) 139/92   12/17/21 138/82          REVIEW OF SYSTEMS:   Pertinent positives are in HPI, otherwise all systems reviewed and negative    PHYSICAL EXAM:    CONSTITUTIONAL:  awake, alert, no apparent distress and BMI 39  ENT:  normocephalic, without obvious abnormality  NECK:  supple, symmetrical, trachea midline   LUNGS:  Resp easy and unlabored  CARDIOVASCULAR:  regular rate and rhythm  MUSCULOSKELETAL: No edema  NEUROLOGIC:  Mental Status Exam:  Level of Alertness:   awake  Orientation:   person, place, time  SKIN: mid back paraspinal approximate 8 x 14 cm subcutaneous mass without overlying skin changes, non-tender        ASSESSMENT:     Diagnosis Orders   1. Lipoma of back              PLAN:    Ms. Rosa Maria Grullon has a large lipoma of her mid back. Will plan on excision of the mid back mass in the OR under MAC anesthesia. The risks, benefits, and alternatives were discussed with the patient and she is willing to consent for the procedure. Will plan for surgery this upcoming week.     Electronically signed by Zaida Linton MD on 3/9/2023 at 3:12 PM

## 2023-03-09 NOTE — LETTER
CONSENT TO OPERATION               Excision of  Mid Back mass     PATIENT : Laura Bagley OF BIRTH:  1980      DATE : 3/9/23       1. I request and consent that Dr. Salena Lundborg and/or his associates or assistants perform an operation and/or procedures on the above patient at [facility], to treat the condition(s) which appear indicated by the diagnostic studies already performed. 2. It has been explained to me by the informing physician that during the course of the operation and/or procedure(s) unforeseen conditions may be revealed that necessitate an extension of the original operation and/or procedure(s) or different operation and/or procedures than those set forth in Paragraph 1. I therefore authorize and request that my physician and/or his associates or assistants perform such operations and/or procedures as are necessary and desirable in the exercise of professional judgment. The authority granted under this Paragraph 2 shall extend to all conditions that require treatment and are known to my physician at the time the operation is commenced. 3. I have been made aware by the informing physician of certain risks and consequences that are associated with the operation and/or procedure(s) described in Paragraph 1, the reasonable alternative methods or treatment, the possible consequences, the possibility that the operation and/or procedure(s) may be unsuccessful and the possibility of complications. I understand the reasonably known risks to be:  - Bleeding  - Infection  - Poor Healing  - Possible Damage to Nerve, Vessel, Tendon/Muscle or Bone  - Need for further Treatment/Surgery  - Stiffness  - Pain  - Residual or Recurrent Symptoms  - Anesthetic and/or Medical Risks     4. I have also been informed by the informing physician that there are other risks from both known and unknown causes that are attendant to the performance of any surgical procedure.   I am aware that the practice of medicine and surgery is not an exact science, and that no guarantees have been made to me concerning the results of the operation and/or procedure(s). 5. I consent to the administration of anesthesia and to the use of such anesthetics as may be deemed advisable by the anesthesiologist who has been engaged by me or my physician. 6. I certify that I have read and understand the above consent to operation and/or other procedure(s); that the explanations therein referred to were made to me by the informing physician in advance of my signing this consent; that all blanks or statements requiring insertion or completion were filled in and inapplicable paragraphs, if any, were stricken before I signed; and that all questions asked by me about the operation and/or procedure(s) which I have consented to have been fully answered in a satisfactory manner.            3/9/23                    _______________________  Signature Of Patient   Date              Witness            OR Date:  ___________Time:  ___________  CPT Code:  ___________ Other: ___________

## 2023-03-09 NOTE — LETTER
Surgery Scheduling Form:      DEMOGRAPHICS:                                                                                                         .    Patient Name:  Claude Raspberry  Patient :  1980   Patient SS#:      Patient Phone:  [phone] (home)  Alt. Patient Phone:                     Patient Address:  [address]    PCP:  Queta [place] MD  Insurance:  Payor: Mora Balkiah / Plan: "[employer] / Product Type: *No Product type* /   Insurance ID Number:    Payer/Plan Subscr  Sex Relation Sub. Ins. ID Effective Group Num   1. DEMETRIUS Dyson 1980 Female Self [id number] [address] [facility] [address]         DIAGNOSIS & PROCEDURE:                                                                                       .    Diagnosis:   D17.1 Lipoma of back  - Primary  Operation:  Excision of mid back mass   Location: [facility] ORTHOPEDIC [facility] [place] OR   Surgeon:    Barbara Mcguire MD        [facility] INFORMATION:                                                                                    .    Surgeon's Scheduling Instruction:  elective  Requested Date: 3-    OR Time: 7:30am          Patient Arrival Time: 6:00am  OR Time Required:  45  Minutes  Anesthesia:  MAC/TIVA  Equipment:                                                             SA Required:  Yes     Status:  Outpatient           Standard C-Arm:  no  PAT Required:   Yes                               Best Time to Call:  ANY  Patient Requested to see PCP for Pre-op H & P:  No   Special Comments:                              Barbara Mcguire MD    23 at 9:87 PM        PRE-CERTIFICATION INFORMATION:                                                                           .    Procedure:       CPT Code Modifier          70643

## 2023-03-09 NOTE — Clinical Note
Ander Almodovar,  I just saw Ms. Isaura Martinez in the office for her back lipoma. I am going to excise it in the OR this upcoming week. Thank you for allowing me to take care of Ms. Chanell Castaneda with you.   Sanibel Moment

## 2023-03-14 ENCOUNTER — TELEPHONE (OUTPATIENT)
Dept: SURGERY | Age: 43
End: 2023-03-14

## 2023-03-14 NOTE — TELEPHONE ENCOUNTER
Patient has someone to take her home after SX, but, does she need someone to come with her at [place]?  Call to advise.

## 2023-03-14 NOTE — TELEPHONE ENCOUNTER
I spoke with Dejan Ellis from [facility], she states that the surgery is PENDING. Advised her I would call the patient and discuss. Options would be to cancel the surgery or possibly be financially responsible for the bill if not approved. Informed the patient. She reports that she wants to continue with the surgery. Discussed with Alfred Lin as well. Advised the patient decided to stay on the schedule.

## 2023-04-04 ENCOUNTER — TELEPHONE (OUTPATIENT)
Dept: SURGERY | Age: 43
End: 2023-04-04

## 2023-04-04 NOTE — TELEPHONE ENCOUNTER
Pt wanting to reschedule sx.  Will reach out to Dr. Grace Schaefer to see what is a good day to schedule her

## 2023-04-27 ENCOUNTER — OFFICE VISIT (OUTPATIENT)
Dept: SURGERY | Age: 43
End: 2023-04-27

## 2023-04-27 VITALS — DIASTOLIC BLOOD PRESSURE: 83 MMHG | SYSTOLIC BLOOD PRESSURE: 148 MMHG | HEART RATE: 82 BPM

## 2023-04-27 DIAGNOSIS — D17.1 LIPOMA OF BACK: Primary | ICD-10-CM

## 2023-04-27 PROCEDURE — 99024 POSTOP FOLLOW-UP VISIT: CPT | Performed by: SURGERY

## 2023-04-27 NOTE — PROGRESS NOTES
Post Operative Visit    [facility]  [address] and Vascular Surgery   Alfonzo Chau MD    [address]  [place]  Phone: [phone]  Fax: [phone]    Sen Gold   YOB: 1980    Date of Visit:  4/27/2023    No ref. provider found  Jose Martin Clarke MD    Subjective:     Sen Gold presents for a two week follow-up s/p excision of right mid back lipoma. Overall she has been doing well since her operation. There has been complete resolution of her pain. It is  controlled with her current pain regimen. She denies any fevers or chills. Pain Score:   0 - No pain    No Known Allergies  Outpatient Medications Marked as Taking for the 4/27/23 encounter (Office Visit) with Sharri Baptiste MD   Medication Sig Dispense Refill    ibuprofen (ADVIL;MOTRIN) 600 MG tablet Take 1 tablet by mouth 3 times daily as needed for Pain 90 tablet 0    docusate sodium (COLACE) 100 MG capsule Take 1 capsule by mouth 2 times daily as needed for Constipation (take while on narcotic pain medication) 60 capsule 0    chlorthalidone (HYGROTON) 25 MG tablet TAKE ONE TABLET BY MOUTH DAILY needs apt before further refills. 90 tablet 3    NIFEdipine (PROCARDIA XL) 30 MG extended release tablet Take 1 tablet by mouth daily Needs apt before refills. 90 tablet 3       Vitals:    04/27/23 1050   BP: (!) 148/83   Pulse: 82     There is no height or weight on file to calculate BMI.      Wt Readings from Last 3 Encounters:   04/11/23 265 lb 15.8 oz (120.7 kg)   02/14/23 253 lb 6.4 oz (114.9 kg)   09/22/22 252 lb (114.3 kg)     BP Readings from Last 3 Encounters:   04/27/23 (!) 148/83   04/11/23 (!) 129/96   02/14/23 120/82          Objective:    CONSTITUTIONAL:  awake, alert, no apparent distress    LUNGS:  Resp easy and unlabored  MUSCULOSKELETAL: No edema  NEUROLOGIC:  Mental Status Exam:  Level of Alertness:   awake  Orientation:   person, place,

## 2023-07-06 ENCOUNTER — OFFICE VISIT (OUTPATIENT)
Dept: PRIMARY CARE CLINIC | Age: 43
End: 2023-07-06
Payer: COMMERCIAL

## 2023-07-06 VITALS
HEIGHT: 67 IN | WEIGHT: 266 LBS | OXYGEN SATURATION: 96 % | SYSTOLIC BLOOD PRESSURE: 111 MMHG | HEART RATE: 80 BPM | BODY MASS INDEX: 41.75 KG/M2 | DIASTOLIC BLOOD PRESSURE: 60 MMHG | TEMPERATURE: 97.5 F

## 2023-07-06 DIAGNOSIS — I87.303 VENOUS HYPERTENSION OF BOTH LOWER EXTREMITIES: Primary | ICD-10-CM

## 2023-07-06 PROCEDURE — G8417 CALC BMI ABV UP PARAM F/U: HCPCS | Performed by: FAMILY MEDICINE

## 2023-07-06 PROCEDURE — 3078F DIAST BP <80 MM HG: CPT | Performed by: FAMILY MEDICINE

## 2023-07-06 PROCEDURE — 4004F PT TOBACCO SCREEN RCVD TLK: CPT | Performed by: FAMILY MEDICINE

## 2023-07-06 PROCEDURE — G8427 DOCREV CUR MEDS BY ELIG CLIN: HCPCS | Performed by: FAMILY MEDICINE

## 2023-07-06 PROCEDURE — 3074F SYST BP LT 130 MM HG: CPT | Performed by: FAMILY MEDICINE

## 2023-07-06 PROCEDURE — 99213 OFFICE O/P EST LOW 20 MIN: CPT | Performed by: FAMILY MEDICINE

## 2023-07-06 SDOH — ECONOMIC STABILITY: HOUSING INSECURITY
IN THE LAST 12 MONTHS, WAS THERE A TIME WHEN YOU DID NOT HAVE A STEADY PLACE TO SLEEP OR SLEPT IN A SHELTER (INCLUDING NOW)?: NO

## 2023-07-06 SDOH — ECONOMIC STABILITY: FOOD INSECURITY: WITHIN THE PAST 12 MONTHS, YOU WORRIED THAT YOUR FOOD WOULD RUN OUT BEFORE YOU GOT MONEY TO BUY MORE.: NEVER TRUE

## 2023-07-06 SDOH — ECONOMIC STABILITY: INCOME INSECURITY: HOW HARD IS IT FOR YOU TO PAY FOR THE VERY BASICS LIKE FOOD, HOUSING, MEDICAL CARE, AND HEATING?: NOT VERY HARD

## 2023-07-06 SDOH — ECONOMIC STABILITY: FOOD INSECURITY: WITHIN THE PAST 12 MONTHS, THE FOOD YOU BOUGHT JUST DIDN'T LAST AND YOU DIDN'T HAVE MONEY TO GET MORE.: NEVER TRUE

## 2023-07-06 ASSESSMENT — ENCOUNTER SYMPTOMS
NAUSEA: 0
DIARRHEA: 0
VOMITING: 0
COUGH: 0
SHORTNESS OF BREATH: 0
CONSTIPATION: 0

## 2023-07-06 NOTE — PROGRESS NOTES
inattentive. Mood and Affect: Affect is labile and inappropriate. Behavior: Behavior is agitated and combative. Judgment: Judgment is impulsive and inappropriate. Lab Results   Component Value Date    LABA1C 5.5 02/14/2023     Lab Results   Component Value Date    WBC 6.5 02/14/2023    HGB 15.1 02/14/2023    HCT 44.8 02/14/2023    MCV 91.1 02/14/2023     02/14/2023      Lab Results   Component Value Date/Time     02/14/2023 01:33 PM    K 3.4 02/14/2023 01:33 PM    CL 98 02/14/2023 01:33 PM    CO2 31 02/14/2023 01:33 PM    BUN 14 02/14/2023 01:33 PM    CREATININE 0.9 02/14/2023 01:33 PM    GLUCOSE 102 02/14/2023 01:33 PM    CALCIUM 10.2 02/14/2023 01:33 PM       Lab Results   Component Value Date    CHOL 208 (H) 02/14/2023    CHOL 195 12/03/2019     Lab Results   Component Value Date    TRIG 131 02/14/2023    TRIG 115 12/03/2019     Lab Results   Component Value Date    HDL 60 02/14/2023    HDL 63 (H) 12/03/2019     Lab Results   Component Value Date    LDLCALC 122 (H) 02/14/2023    LDLCALC 109 (H) 12/03/2019     Lab Results   Component Value Date    LABVLDL 26 02/14/2023    LABVLDL 23 12/03/2019     No results found for: CHOLHDLRATIO     The 10-year ASCVD risk score (Chucky DK, et al., 2019) is: 1.6%    Values used to calculate the score:      Age: 36 years      Sex: Female      Is Non- : Yes      Diabetic: No      Tobacco smoker: Yes      Systolic Blood Pressure: 156 mmHg      Is BP treated: Yes      HDL Cholesterol: 60 mg/dL      Total Cholesterol: 208 mg/dL     Patient received counseling and, if relevant, printed instructions for all symptoms listed in CC and HPI, as well as for all diagnoses brought onto today's visit note below.  Typical counseling includes, but is not limited to, non-pharmacologic measures to manage listed symptoms and conditions; appropriate use, risks and benefits for all prescribed medications; potential interactions

## 2023-07-18 ENCOUNTER — NURSE ONLY (OUTPATIENT)
Dept: CARDIOLOGY CLINIC | Age: 43
End: 2023-07-18

## 2023-07-18 ENCOUNTER — TELEPHONE (OUTPATIENT)
Dept: CARDIOLOGY CLINIC | Age: 43
End: 2023-07-18

## 2023-07-18 DIAGNOSIS — R00.2 PALPITATION: ICD-10-CM

## 2023-07-18 NOTE — TELEPHONE ENCOUNTER
Monitor placed by Hernandez Husbands of monitor 14 days  Monitor ordered by /Kimberly Rivera MD  Serial number [id number]  Kit ID   Activation successful prior to pt leaving office?  Yes

## 2023-07-20 ENCOUNTER — NURSE ONLY (OUTPATIENT)
Dept: CARDIOLOGY CLINIC | Age: 43
End: 2023-07-20

## 2023-07-20 ENCOUNTER — TELEPHONE (OUTPATIENT)
Dept: CARDIOLOGY CLINIC | Age: 43
End: 2023-07-20

## 2023-07-20 NOTE — TELEPHONE ENCOUNTER
Monitor placed by Arina Antoine / 2nd Patch  Length of monitor 14 days  Monitor ordered by Dr. Karen Arvizu MD  Serial number [id number]  Kit ID   Activation successful prior to pt leaving office? Yes         Disregard 1st patch  Monitor placed by Arina Antoine / 1st Patch  Length of monitor 14 days  Monitor ordered by /Kimberly Guajardo MD  Serial number [id number]  Kit ID   Activation successful prior to pt leaving office? yes

## 2023-08-23 ENCOUNTER — APPOINTMENT (OUTPATIENT)
Dept: GENERAL RADIOLOGY | Age: 43
End: 2023-08-23

## 2023-08-23 ENCOUNTER — HOSPITAL ENCOUNTER (EMERGENCY)
Age: 43
Discharge: HOME | End: 2023-08-24

## 2023-08-23 DIAGNOSIS — R60.9 DEPENDENT EDEMA: Primary | ICD-10-CM

## 2023-08-23 LAB
ALBUMIN SERPL-MCNC: 4.1 G/DL (ref 3.4–5)
ALBUMIN/GLOB SERPL: 1.4 {RATIO} (ref 1.1–2.2)
ALP SERPL-CCNC: 77 U/L (ref 40–129)
ALT SERPL-CCNC: 13 U/L (ref 10–40)
ANION GAP SERPL CALCULATED.3IONS-SCNC: 12 MMOL/L (ref 3–16)
AST SERPL-CCNC: 20 U/L (ref 15–37)
BASOPHILS # BLD: 0.1 K/UL (ref 0–0.2)
BASOPHILS NFR BLD: 1.4 %
BILIRUB SERPL-MCNC: <0.2 MG/DL (ref 0–1)
BUN SERPL-MCNC: 12 MG/DL (ref 7–20)
CALCIUM SERPL-MCNC: 9.3 MG/DL (ref 8.3–10.6)
CHLORIDE SERPL-SCNC: 101 MMOL/L (ref 99–110)
CO2 SERPL-SCNC: 24 MMOL/L (ref 21–32)
CREAT SERPL-MCNC: 0.9 MG/DL (ref 0.6–1.1)
DEPRECATED RDW RBC AUTO: 13.9 % (ref 12.4–15.4)
EOSINOPHIL # BLD: 0.3 K/UL (ref 0–0.6)
EOSINOPHIL NFR BLD: 3.8 %
GFR SERPLBLD CREATININE-BSD FMLA CKD-EPI: >60 ML/MIN/{1.73_M2}
GLUCOSE SERPL-MCNC: 148 MG/DL (ref 70–99)
HCT VFR BLD AUTO: 39.7 % (ref 36–48)
HGB BLD-MCNC: 14 G/DL (ref 12–16)
LIPASE SERPL-CCNC: 26 U/L (ref 13–60)
LYMPHOCYTES # BLD: 3.4 K/UL (ref 1–5.1)
LYMPHOCYTES NFR BLD: 43 %
MCH RBC QN AUTO: 31.5 PG (ref 26–34)
MCHC RBC AUTO-ENTMCNC: 35.2 G/DL (ref 31–36)
MCV RBC AUTO: 89.4 FL (ref 80–100)
MONOCYTES # BLD: 0.4 K/UL (ref 0–1.3)
MONOCYTES NFR BLD: 5.5 %
NEUTROPHILS # BLD: 3.6 K/UL (ref 1.7–7.7)
NEUTROPHILS NFR BLD: 46.3 %
NT-PROBNP SERPL-MCNC: 217 PG/ML (ref 0–124)
PLATELET # BLD AUTO: 283 K/UL (ref 135–450)
PMV BLD AUTO: 8.8 FL (ref 5–10.5)
POTASSIUM SERPL-SCNC: 3.6 MMOL/L (ref 3.5–5.1)
PROT SERPL-MCNC: 7.1 G/DL (ref 6.4–8.2)
RBC # BLD AUTO: 4.44 M/UL (ref 4–5.2)
SODIUM SERPL-SCNC: 137 MMOL/L (ref 136–145)
TROPONIN, HIGH SENSITIVITY: <6 NG/L (ref 0–14)
WBC # BLD AUTO: 7.9 K/UL (ref 4–11)

## 2023-08-23 PROCEDURE — 80053 COMPREHEN METABOLIC PANEL: CPT

## 2023-08-23 PROCEDURE — 71046 X-RAY EXAM CHEST 2 VIEWS: CPT

## 2023-08-23 PROCEDURE — 83880 ASSAY OF NATRIURETIC PEPTIDE: CPT

## 2023-08-23 PROCEDURE — 93005 ELECTROCARDIOGRAM TRACING: CPT | Performed by: PHYSICIAN ASSISTANT

## 2023-08-23 PROCEDURE — 83690 ASSAY OF LIPASE: CPT

## 2023-08-23 PROCEDURE — 99285 EMERGENCY DEPT VISIT HI MDM: CPT

## 2023-08-23 PROCEDURE — 85025 COMPLETE CBC W/AUTO DIFF WBC: CPT

## 2023-08-23 PROCEDURE — 84484 ASSAY OF TROPONIN QUANT: CPT

## 2023-08-23 ASSESSMENT — ENCOUNTER SYMPTOMS
SORE THROAT: 0
BACK PAIN: 0
SHORTNESS OF BREATH: 0
NAUSEA: 0
VOMITING: 0
ABDOMINAL PAIN: 0
COUGH: 0
EYE PAIN: 0

## 2023-08-23 ASSESSMENT — LIFESTYLE VARIABLES
HOW OFTEN DO YOU HAVE A DRINK CONTAINING ALCOHOL: NEVER
HOW MANY STANDARD DRINKS CONTAINING ALCOHOL DO YOU HAVE ON A TYPICAL DAY: PATIENT DOES NOT DRINK

## 2023-08-23 ASSESSMENT — PAIN SCALES - GENERAL
PAINLEVEL_OUTOF10: 0
PAINLEVEL_OUTOF10: 0

## 2023-08-24 VITALS
WEIGHT: 266.76 LBS | RESPIRATION RATE: 15 BRPM | BODY MASS INDEX: 41.87 KG/M2 | SYSTOLIC BLOOD PRESSURE: 140 MMHG | DIASTOLIC BLOOD PRESSURE: 58 MMHG | HEIGHT: 67 IN | OXYGEN SATURATION: 97 % | TEMPERATURE: 98.1 F | HEART RATE: 70 BPM

## 2023-08-24 DIAGNOSIS — R73.09 ELEVATED GLUCOSE: Primary | ICD-10-CM

## 2023-08-24 LAB
EKG ATRIAL RATE: 79 BPM
EKG DIAGNOSIS: NORMAL
EKG P AXIS: 49 DEGREES
EKG P-R INTERVAL: 190 MS
EKG Q-T INTERVAL: 406 MS
EKG QRS DURATION: 90 MS
EKG QTC CALCULATION (BAZETT): 465 MS
EKG R AXIS: 16 DEGREES
EKG T AXIS: 43 DEGREES
EKG VENTRICULAR RATE: 79 BPM

## 2023-08-24 PROCEDURE — 93010 ELECTROCARDIOGRAM REPORT: CPT | Performed by: INTERNAL MEDICINE

## 2023-08-24 NOTE — ED TRIAGE NOTES
Pt c/o dizziness and lightheaded upon standing that just started randomly. Pt states \"she can just tell something is wrong because she does not feel good. \" Pt states she is having bilateral lower extremity swelling alyson ankles and feet even with wearing support stockings. Pt denies hx of CHF, NVD. Pain 6/10.

## 2023-08-24 NOTE — ED PROVIDER NOTES
or not returned at the time of this note. RADIOLOGY:   Non-plain film images such as CT, Ultrasound and MRI are read by the radiologist. Rowan Grayson PA-C have directly visualized the radiologic plain film image(s) with the below findings:          Interpretation per the Radiologist below, if available at the time of this note:    XR CHEST (2 VW)   Final Result   Clear chest without acute cardiopulmonary process. No results found. No results found. MEDICAL DECISION MAKING / ED COURSE:      PROCEDURES:   Procedures    Patient was given:  Medications - No data to display    CONSULTS: (Who and What was discussed)  None      Chronic Conditions affecting care:    has a past medical history of Hypertension. Records Reviewed (External and Source)   I personally reviewed patient medical record    CC/HPI Summary, DDx, ED Course, and Reassessment:   Emergency room course see HPI and physical exam above  Patient on exam cardiovascular regular rhythm, lungs are clear no wheeze rales or rhonchi noted. Abdomen soft nontender. Nondistended. Normal bowel sounds all 4 quadrant. Bilateral lower extremity shows no pitting edema at this time. She is alert oriented x4. Does not appear in acute distress    Lab result for today shows:  CBC within normal limits white count 7.9. CMP unremarkable.   Troponin less than 6  Lipase 26.0    EKG showed no acute changes. No ST elevations or depressions noted. Chest x-ray showed clear chest without acute cardiopulmonary process. I discussed patient lab results with her from today chest x-ray from today as well as her EKG. I will refer her back to Dr. Lizette Olmos her cardiologist as well as her primary care provider. Continue her home medication as prescribed. She will be discharged stable condition. She is okay with this plan. Orthostatics all within normal limits.             Disposition Considerations (Tests not ordered but considered, Shared

## 2023-08-24 NOTE — DISCHARGE INSTRUCTIONS
Follow-up to primary care provider  Continue your home medication as prescribed only  Return emergency room for any worsening  Follow-up with your cardiologist Dr. Ballard Boehringer

## 2023-08-24 NOTE — ED NOTES
Discharge and education instructions reviewed. Patient verbalized understanding, teach-back successful. Patient denied questions at this time. No acute distress noted. Patient instructed to follow-up as noted - return to emergency department if symptoms worsen. Patient verbalized understanding. Discharged per EDMD with discharge instructions.        Calixot Hickey RN  08/24/23 0004

## 2024-04-13 DIAGNOSIS — I10 ESSENTIAL HYPERTENSION: ICD-10-CM

## 2024-04-15 RX ORDER — CHLORTHALIDONE 25 MG/1
TABLET ORAL
Qty: 90 TABLET | Refills: 3 | Status: SHIPPED | OUTPATIENT
Start: 2024-04-15

## 2024-04-15 RX ORDER — NIFEDIPINE 30 MG/1
30 TABLET, EXTENDED RELEASE ORAL DAILY
Qty: 90 TABLET | Refills: 3 | Status: SHIPPED | OUTPATIENT
Start: 2024-04-15

## 2024-04-15 NOTE — TELEPHONE ENCOUNTER
Medication:   Requested Prescriptions     Pending Prescriptions Disp Refills    NIFEdipine (PROCARDIA XL) 30 MG extended release tablet [Pharmacy Med Name: NIFEdipine ER 30 MG TAB, 24 HR] 90 tablet 3     Sig: TAKE ONE TABLET BY MOUTH DAILY    chlorthalidone (HYGROTON) 25 MG tablet [Pharmacy Med Name: CHLORTHALIDONE 25 MG TABLET] 90 tablet 3     Sig: TAKE ONE TABLET BY MOUTH DAILY        Last Filled:      Patient Phone Number: [phone] (home)     Last appt: 7/6/2023   Next appt: Visit date not found    Last OARRS:        No data to display

## 2024-04-18 ENCOUNTER — TELEPHONE (OUTPATIENT)
Dept: PRIMARY CARE CLINIC | Age: 44
End: 2024-04-18

## 2024-04-18 NOTE — TELEPHONE ENCOUNTER
----- Message from Chris Bansal sent at 4/18/2024 11:58 AM EDT -----  Regarding: ECC Message to Provider  ECC Message to Provider    Relationship to Patient: Self     Additional Information: The recommendation letter to be approval from the PCP Dr. Kimberly Rodriguez before the surgery and should be faxed to the weight loss center before  her gastric surgery.  --------------------------------------------------------------------------------------------------------------------------    Call Back Information: OK to leave message on voicemail  Preferred Call Back Number: Phone [phone]

## 2024-04-19 ASSESSMENT — PATIENT HEALTH QUESTIONNAIRE - PHQ9
2. FEELING DOWN, DEPRESSED OR HOPELESS: NOT AT ALL
SUM OF ALL RESPONSES TO PHQ QUESTIONS 1-9: 0
2. FEELING DOWN, DEPRESSED OR HOPELESS: NOT AT ALL
SUM OF ALL RESPONSES TO PHQ9 QUESTIONS 1 & 2: 0
SUM OF ALL RESPONSES TO PHQ QUESTIONS 1-9: 0
1. LITTLE INTEREST OR PLEASURE IN DOING THINGS: NOT AT ALL
SUM OF ALL RESPONSES TO PHQ9 QUESTIONS 1 & 2: 0
1. LITTLE INTEREST OR PLEASURE IN DOING THINGS: NOT AT ALL

## 2024-04-22 ENCOUNTER — OFFICE VISIT (OUTPATIENT)
Dept: PRIMARY CARE CLINIC | Age: 44
End: 2024-04-22
Payer: COMMERCIAL

## 2024-04-22 VITALS
HEIGHT: 68 IN | TEMPERATURE: 97.3 F | OXYGEN SATURATION: 98 % | RESPIRATION RATE: 16 BRPM | SYSTOLIC BLOOD PRESSURE: 122 MMHG | HEART RATE: 87 BPM | BODY MASS INDEX: 40.25 KG/M2 | WEIGHT: 265.6 LBS | DIASTOLIC BLOOD PRESSURE: 84 MMHG

## 2024-04-22 DIAGNOSIS — Z71.6 ENCOUNTER FOR SMOKING CESSATION COUNSELING: ICD-10-CM

## 2024-04-22 DIAGNOSIS — I10 ESSENTIAL HYPERTENSION: Primary | ICD-10-CM

## 2024-04-22 DIAGNOSIS — E55.9 VITAMIN D DEFICIENCY: ICD-10-CM

## 2024-04-22 DIAGNOSIS — Z12.31 ENCOUNTER FOR SCREENING MAMMOGRAM FOR BREAST CANCER: ICD-10-CM

## 2024-04-22 DIAGNOSIS — N39.41 URGE INCONTINENCE: ICD-10-CM

## 2024-04-22 PROCEDURE — G8427 DOCREV CUR MEDS BY ELIG CLIN: HCPCS | Performed by: INTERNAL MEDICINE

## 2024-04-22 PROCEDURE — G8417 CALC BMI ABV UP PARAM F/U: HCPCS | Performed by: INTERNAL MEDICINE

## 2024-04-22 PROCEDURE — 99214 OFFICE O/P EST MOD 30 MIN: CPT | Performed by: INTERNAL MEDICINE

## 2024-04-22 PROCEDURE — 3074F SYST BP LT 130 MM HG: CPT | Performed by: INTERNAL MEDICINE

## 2024-04-22 PROCEDURE — 4004F PT TOBACCO SCREEN RCVD TLK: CPT | Performed by: INTERNAL MEDICINE

## 2024-04-22 PROCEDURE — 3079F DIAST BP 80-89 MM HG: CPT | Performed by: INTERNAL MEDICINE

## 2024-04-22 RX ORDER — VARENICLINE TARTRATE 0.5 MG/1
.5-1 TABLET, FILM COATED ORAL SEE ADMIN INSTRUCTIONS
Qty: 57 TABLET | Refills: 0 | Status: SHIPPED | OUTPATIENT
Start: 2024-04-22

## 2024-04-22 NOTE — PROGRESS NOTES
Subjective   SUBJECTIVE/OBJECTIVE:  Hypertension  This is a chronic problem. The current episode started more than 1 year ago. The problem is unchanged. The problem is controlled. Pertinent negatives include no anxiety, blurred vision, chest pain, headaches, malaise/fatigue, neck pain, orthopnea, palpitations, peripheral edema, PND, shortness of breath or sweats. There are no associated agents to hypertension. Risk factors for coronary artery disease include obesity. Past treatments include diuretics, lifestyle changes and calcium channel blockers. The current treatment provides significant improvement. Compliance problems include exercise and diet.  There is no history of kidney disease or heart failure.   Weight Management  This is a chronic (Patient has enrolled in the [facility] bariatric clinic to be evaluated for weight loss surgery to treat long-term cardiovascular risk hypertension and hopefully after weight loss require less medication) problem. The current episode started more than 1 year ago. The problem occurs constantly. The problem has been waxing and waning (A year ago patient lost 40 pounds with a low-carb diet but could not emotionally sustain that lifestyle and regained the weight). Pertinent negatives include no anorexia, arthralgias, chest pain, chills, congestion, coughing, diaphoresis, fever, headaches, joint swelling, myalgias, nausea, neck pain, numbness, rash, sore throat, swollen glands, urinary symptoms, vertigo, visual change, vomiting or weakness. Associated symptoms comments: Patient notices that she is a stress eater and that the bariatric program will be seen psychologist to help with this. The symptoms are aggravated by stress. Treatments tried: Currently enrolled in the bariatric clinic and trying to maintain a low-carb diet. The treatment provided moderate relief.       Review of Systems   Constitutional: Negative.  Negative for chills, diaphoresis, fever and

## 2024-04-23 DIAGNOSIS — E55.9 VITAMIN D DEFICIENCY: ICD-10-CM

## 2024-04-24 LAB
25(OH)D3 SERPL-MCNC: 20 NG/ML
ALBUMIN SERPL-MCNC: 4.3 G/DL (ref 3.4–5)
ALBUMIN/GLOB SERPL: 1.5 {RATIO} (ref 1.1–2.2)
ALP SERPL-CCNC: 72 U/L (ref 40–129)
ALT SERPL-CCNC: 16 U/L (ref 10–40)
ANION GAP SERPL CALCULATED.3IONS-SCNC: 26 MMOL/L (ref 3–16)
AST SERPL-CCNC: 24 U/L (ref 15–37)
BASOPHILS # BLD: 0.1 K/UL (ref 0–0.2)
BASOPHILS NFR BLD: 0.9 %
BILIRUB SERPL-MCNC: <0.2 MG/DL (ref 0–1)
BUN SERPL-MCNC: 13 MG/DL (ref 7–20)
CALCIUM SERPL-MCNC: 9.8 MG/DL (ref 8.3–10.6)
CHLORIDE SERPL-SCNC: 92 MMOL/L (ref 99–110)
CHOLEST SERPL-MCNC: 239 MG/DL (ref 0–199)
CO2 SERPL-SCNC: 26 MMOL/L (ref 21–32)
CREAT SERPL-MCNC: 0.8 MG/DL (ref 0.6–1.1)
DEPRECATED RDW RBC AUTO: 13.8 % (ref 12.4–15.4)
EOSINOPHIL # BLD: 0.3 K/UL (ref 0–0.6)
EOSINOPHIL NFR BLD: 3.3 %
EST. AVERAGE GLUCOSE BLD GHB EST-MCNC: 114 MG/DL
GFR SERPLBLD CREATININE-BSD FMLA CKD-EPI: >90 ML/MIN/{1.73_M2}
GLUCOSE SERPL-MCNC: 97 MG/DL (ref 70–99)
HBA1C MFR BLD: 5.6 %
HCT VFR BLD AUTO: 44.4 % (ref 36–48)
HDLC SERPL-MCNC: 53 MG/DL (ref 40–60)
HGB BLD-MCNC: 14.9 G/DL (ref 12–16)
LDLC SERPL CALC-MCNC: 129 MG/DL
LYMPHOCYTES # BLD: 3.3 K/UL (ref 1–5.1)
LYMPHOCYTES NFR BLD: 40.8 %
MCH RBC QN AUTO: 30.1 PG (ref 26–34)
MCHC RBC AUTO-ENTMCNC: 33.5 G/DL (ref 31–36)
MCV RBC AUTO: 89.8 FL (ref 80–100)
MONOCYTES # BLD: 0.5 K/UL (ref 0–1.3)
MONOCYTES NFR BLD: 6.6 %
NEUTROPHILS # BLD: 4 K/UL (ref 1.7–7.7)
NEUTROPHILS NFR BLD: 48.4 %
PLATELET # BLD AUTO: 302 K/UL (ref 135–450)
PLATELET BLD QL SMEAR: ABNORMAL
PMV BLD AUTO: 10.6 FL (ref 5–10.5)
POTASSIUM SERPL-SCNC: 3.5 MMOL/L (ref 3.5–5.1)
PROT SERPL-MCNC: 7.2 G/DL (ref 6.4–8.2)
RBC # BLD AUTO: 4.94 M/UL (ref 4–5.2)
SLIDE REVIEW: ABNORMAL
SODIUM SERPL-SCNC: 144 MMOL/L (ref 136–145)
TRIGL SERPL-MCNC: 284 MG/DL (ref 0–150)
TSH SERPL DL<=0.005 MIU/L-ACNC: 1.02 UIU/ML (ref 0.27–4.2)
VLDLC SERPL CALC-MCNC: 57 MG/DL
WBC # BLD AUTO: 8.2 K/UL (ref 4–11)

## 2024-04-25 DIAGNOSIS — E55.9 VITAMIN D DEFICIENCY: Primary | ICD-10-CM

## 2024-04-25 LAB — FRUCTOSAMINE SERPL-SCNC: 263 UMOL/L (ref 205–285)

## 2024-04-25 RX ORDER — MELATONIN
3000 DAILY
Qty: 90 TABLET | Refills: 12 | Status: SHIPPED | OUTPATIENT
Start: 2024-04-25

## 2024-04-26 NOTE — RESULT ENCOUNTER NOTE
The kidney blood test are normal  The liver blood test are normal.  The LDL cholesterol needs to decrease from 129 to less than 100.  The good HDL cholesterol is good at 53.  Start walking for 30 minutes daily eat a lot of fresh fruits and vegetables avoid fried and fatty foods only eat whole-grain carbs such as whole-grain cereal and not very much carbs.  Eat lean proteins such as fish or chicken and a lot of green vegetables.  We should repeat the cholesterol in 6 months on this regimen.  Your vitamin D level is low.  Normally the vitamin D level should be above 30.  A level of 50 is ideal.  Vitamin D is important for healthy bones and a healthy immune system.  Low vitamin D levels making you more prone to getting viral infections.  Also a chronically low vitamin D level is a risk factor for breast and colon cancer.  I sent a vitamin D prescription to your pharmacy.  If it is not at the pharmacy it means it is not covered and you can purchase the same thing over-the-counter.  Purchase vitamin D3 1000 units gelcaps and take 3 daily.  We should repeat the vitamin D level in 3 months.  There is no anemia.  The A1c shows no diabetes.

## 2024-05-21 DIAGNOSIS — I10 ESSENTIAL HYPERTENSION: Primary | ICD-10-CM

## 2024-05-21 RX ORDER — LOSARTAN POTASSIUM 25 MG/1
25 TABLET ORAL DAILY
Qty: 90 TABLET | Refills: 1 | Status: SHIPPED | OUTPATIENT
Start: 2024-05-21

## 2024-05-21 RX ORDER — SEMAGLUTIDE 0.25 MG/.5ML
0.25 INJECTION, SOLUTION SUBCUTANEOUS
Qty: 2 ML | Refills: 0 | Status: SHIPPED | OUTPATIENT
Start: 2024-05-21 | End: 2024-05-24

## 2024-05-24 ENCOUNTER — TELEPHONE (OUTPATIENT)
Dept: PRIMARY CARE CLINIC | Age: 44
End: 2024-05-24

## 2024-05-24 RX ORDER — SEMAGLUTIDE 0.5 MG/.5ML
0.5 INJECTION, SOLUTION SUBCUTANEOUS
Qty: 2 ML | Refills: 0 | Status: SHIPPED | OUTPATIENT
Start: 2024-05-24

## 2024-05-24 NOTE — TELEPHONE ENCOUNTER
Patient says she  has bilateral edema in both ankles due to sitting a lot. She wants to know if Dr. Rodriguez will call something in for it. Please advise ASAP

## 2024-05-25 DIAGNOSIS — Z71.6 ENCOUNTER FOR SMOKING CESSATION COUNSELING: ICD-10-CM

## 2024-05-27 DIAGNOSIS — Z71.6 ENCOUNTER FOR SMOKING CESSATION COUNSELING: Primary | ICD-10-CM

## 2024-05-27 RX ORDER — VARENICLINE TARTRATE 1 MG/1
1 TABLET, FILM COATED ORAL 2 TIMES DAILY
Qty: 60 TABLET | Refills: 5 | Status: SHIPPED | OUTPATIENT
Start: 2024-05-27

## 2024-05-28 NOTE — TELEPHONE ENCOUNTER
Medication:   Requested Prescriptions     Pending Prescriptions Disp Refills    Varenicline Tartrate, Starter, 0.5 MG X 11 & 1 MG X 42 TBPK [Pharmacy Med Name: VARENICLINE STARTING MONTH BOX]       Sig: TAKE 1 (0.5MG) TAB BY MOUTH DAILY FOR 3 DAYS, THEN TAKE 1 (0.5MG) TAB TWICE DAILY FOR 4 DAYS, THEN TAKE 1 (1MG) TAB TWICE DAILY THEREAFTER        Last Filled:      Patient Phone Number: [phone] (home)     Last appt: 4/22/2024   Next appt: 6/12/2024    Last OARRS:        No data to display

## 2024-05-29 RX ORDER — VARENICLINE TARTRATE 0.5 (11)-1
KIT ORAL
OUTPATIENT
Start: 2024-05-29

## 2024-05-29 NOTE — TELEPHONE ENCOUNTER
Patient called and leg bilateral edema getting better with the losartan 25 mg and continuing the nifedipine XL 30 mg daily.  She knows she can take a half of a chlorthalidone 25 mg once a week as needed for edema.  Edema going down with walking daily and low-salt diet

## 2024-06-03 ENCOUNTER — PATIENT MESSAGE (OUTPATIENT)
Dept: PRIMARY CARE CLINIC | Age: 44
End: 2024-06-03

## 2024-06-03 ENCOUNTER — TELEPHONE (OUTPATIENT)
Dept: PRIMARY CARE CLINIC | Age: 44
End: 2024-06-03

## 2024-06-03 NOTE — TELEPHONE ENCOUNTER
----- Message from Clare Lloyd MA sent at 6/3/2024  7:59 AM EDT -----  Regarding: FW: Mammogram   Contact: [phone]        ----- Message -----  From: Ivania Jenkins  Sent: 6/2/2024   4:58 PM EDT  To: [facility] Clinical Staff  Subject: Mammogram                                        Where do for my mammogram?

## 2024-06-03 NOTE — TELEPHONE ENCOUNTER
Called patient & left voicemail message for mammogram will be here 6/11/24 available appointment for 3:00 & 3:15 pm.

## 2024-06-12 ENCOUNTER — TELEPHONE (OUTPATIENT)
Dept: ADMINISTRATIVE | Age: 44
End: 2024-06-12

## 2024-06-12 NOTE — TELEPHONE ENCOUNTER
Submitted PA for Wegovy 0.5MG/0.5ML auto-injectors  Via CMM (Key: [id number]) STATUS: PENDING.    Electronic prior authorization not supported as NDC is not payable.     Follow up done daily; if no decision with in three days we will refax.  If another three days goes by with no decision will call the insurance for status.

## 2024-06-17 DIAGNOSIS — Z71.6 ENCOUNTER FOR SMOKING CESSATION COUNSELING: ICD-10-CM

## 2024-06-17 RX ORDER — VARENICLINE TARTRATE 0.5 (11)-1
KIT ORAL
OUTPATIENT
Start: 2024-06-17

## 2024-08-02 ENCOUNTER — APPOINTMENT (OUTPATIENT)
Dept: CT IMAGING | Age: 44
DRG: 190 | End: 2024-08-02
Payer: COMMERCIAL

## 2024-08-02 ENCOUNTER — HOSPITAL ENCOUNTER (INPATIENT)
Age: 44
LOS: 3 days | Discharge: HOME | DRG: 190 | End: 2024-08-06
Attending: STUDENT IN AN ORGANIZED HEALTH CARE EDUCATION/TRAINING PROGRAM
Payer: COMMERCIAL

## 2024-08-02 DIAGNOSIS — R06.00 DYSPNEA, UNSPECIFIED TYPE: ICD-10-CM

## 2024-08-02 DIAGNOSIS — R07.9 CHEST PAIN, UNSPECIFIED TYPE: Primary | ICD-10-CM

## 2024-08-02 DIAGNOSIS — I21.4 NON-STEMI (NON-ST ELEVATED MYOCARDIAL INFARCTION) (HCC): ICD-10-CM

## 2024-08-02 DIAGNOSIS — R79.89 ELEVATED TROPONIN: ICD-10-CM

## 2024-08-02 LAB
ALBUMIN SERPL-MCNC: 3.8 G/DL (ref 3.4–5)
ALBUMIN/GLOB SERPL: 1.4 {RATIO} (ref 1.1–2.2)
ALP SERPL-CCNC: 78 U/L (ref 40–129)
ALT SERPL-CCNC: 10 U/L (ref 10–40)
ANION GAP SERPL CALCULATED.3IONS-SCNC: 14 MMOL/L (ref 3–16)
AST SERPL-CCNC: 13 U/L (ref 15–37)
BASOPHILS # BLD: 0.1 K/UL (ref 0–0.2)
BASOPHILS NFR BLD: 0.8 %
BILIRUB SERPL-MCNC: <0.2 MG/DL (ref 0–1)
BUN SERPL-MCNC: 16 MG/DL (ref 7–20)
CALCIUM SERPL-MCNC: 9.9 MG/DL (ref 8.3–10.6)
CHLORIDE SERPL-SCNC: 103 MMOL/L (ref 99–110)
CO2 SERPL-SCNC: 24 MMOL/L (ref 21–32)
CREAT SERPL-MCNC: 0.9 MG/DL (ref 0.6–1.1)
DEPRECATED RDW RBC AUTO: 14 % (ref 12.4–15.4)
EOSINOPHIL # BLD: 0.3 K/UL (ref 0–0.6)
EOSINOPHIL NFR BLD: 2.8 %
GFR SERPLBLD CREATININE-BSD FMLA CKD-EPI: 80 ML/MIN/{1.73_M2}
GLUCOSE SERPL-MCNC: 103 MG/DL (ref 70–99)
HCG SERPL QL: NEGATIVE
HCT VFR BLD AUTO: 38.6 % (ref 36–48)
HGB BLD-MCNC: 13.2 G/DL (ref 12–16)
LYMPHOCYTES # BLD: 4 K/UL (ref 1–5.1)
LYMPHOCYTES NFR BLD: 44.4 %
MAGNESIUM SERPL-MCNC: 1.6 MG/DL (ref 1.8–2.4)
MCH RBC QN AUTO: 30.9 PG (ref 26–34)
MCHC RBC AUTO-ENTMCNC: 34.2 G/DL (ref 31–36)
MCV RBC AUTO: 90.3 FL (ref 80–100)
MONOCYTES # BLD: 0.7 K/UL (ref 0–1.3)
MONOCYTES NFR BLD: 7.6 %
NEUTROPHILS # BLD: 4 K/UL (ref 1.7–7.7)
NEUTROPHILS NFR BLD: 44.4 %
NT-PROBNP SERPL-MCNC: 266 PG/ML (ref 0–124)
PLATELET # BLD AUTO: 275 K/UL (ref 135–450)
PMV BLD AUTO: 8.9 FL (ref 5–10.5)
POTASSIUM SERPL-SCNC: 3.5 MMOL/L (ref 3.5–5.1)
PROT SERPL-MCNC: 6.5 G/DL (ref 6.4–8.2)
RBC # BLD AUTO: 4.27 M/UL (ref 4–5.2)
SODIUM SERPL-SCNC: 141 MMOL/L (ref 136–145)
TROPONIN, HIGH SENSITIVITY: 94 NG/L (ref 0–14)
WBC # BLD AUTO: 9.1 K/UL (ref 4–11)

## 2024-08-02 PROCEDURE — 84484 ASSAY OF TROPONIN QUANT: CPT

## 2024-08-02 PROCEDURE — 2580000003 HC RX 258: Performed by: STUDENT IN AN ORGANIZED HEALTH CARE EDUCATION/TRAINING PROGRAM

## 2024-08-02 PROCEDURE — 36415 COLL VENOUS BLD VENIPUNCTURE: CPT

## 2024-08-02 PROCEDURE — 84703 CHORIONIC GONADOTROPIN ASSAY: CPT

## 2024-08-02 PROCEDURE — 74174 CTA ABD&PLVS W/CONTRAST: CPT

## 2024-08-02 PROCEDURE — 85730 THROMBOPLASTIN TIME PARTIAL: CPT

## 2024-08-02 PROCEDURE — 6360000004 HC RX CONTRAST MEDICATION: Performed by: STUDENT IN AN ORGANIZED HEALTH CARE EDUCATION/TRAINING PROGRAM

## 2024-08-02 PROCEDURE — 96374 THER/PROPH/DIAG INJ IV PUSH: CPT

## 2024-08-02 PROCEDURE — 85025 COMPLETE CBC W/AUTO DIFF WBC: CPT

## 2024-08-02 PROCEDURE — 83880 ASSAY OF NATRIURETIC PEPTIDE: CPT

## 2024-08-02 PROCEDURE — 6370000000 HC RX 637 (ALT 250 FOR IP): Performed by: STUDENT IN AN ORGANIZED HEALTH CARE EDUCATION/TRAINING PROGRAM

## 2024-08-02 PROCEDURE — 83735 ASSAY OF MAGNESIUM: CPT

## 2024-08-02 PROCEDURE — 2500000003 HC RX 250 WO HCPCS: Performed by: STUDENT IN AN ORGANIZED HEALTH CARE EDUCATION/TRAINING PROGRAM

## 2024-08-02 PROCEDURE — 99285 EMERGENCY DEPT VISIT HI MDM: CPT

## 2024-08-02 PROCEDURE — 93005 ELECTROCARDIOGRAM TRACING: CPT | Performed by: STUDENT IN AN ORGANIZED HEALTH CARE EDUCATION/TRAINING PROGRAM

## 2024-08-02 PROCEDURE — 80053 COMPREHEN METABOLIC PANEL: CPT

## 2024-08-02 RX ORDER — ASPIRIN 81 MG/1
324 TABLET, CHEWABLE ORAL ONCE
Status: COMPLETED | OUTPATIENT
Start: 2024-08-02 | End: 2024-08-02

## 2024-08-02 RX ADMIN — FAMOTIDINE 20 MG: 10 INJECTION, SOLUTION INTRAVENOUS at 23:29

## 2024-08-02 RX ADMIN — ASPIRIN 81 MG 324 MG: 81 TABLET ORAL at 23:30

## 2024-08-02 ASSESSMENT — PAIN - FUNCTIONAL ASSESSMENT: PAIN_FUNCTIONAL_ASSESSMENT: 0-10

## 2024-08-02 ASSESSMENT — PAIN DESCRIPTION - LOCATION: LOCATION: CHEST

## 2024-08-02 ASSESSMENT — PAIN SCALES - GENERAL: PAINLEVEL_OUTOF10: 10

## 2024-08-02 ASSESSMENT — PAIN DESCRIPTION - PAIN TYPE: TYPE: ACUTE PAIN

## 2024-08-03 PROBLEM — I21.4 NSTEMI (NON-ST ELEVATED MYOCARDIAL INFARCTION) (HCC): Status: ACTIVE | Noted: 2024-08-03

## 2024-08-03 LAB
ANTI-XA UNFRAC HEPARIN: 0.26 IU/ML (ref 0.3–0.7)
ANTI-XA UNFRAC HEPARIN: <0.1 IU/ML (ref 0.3–0.7)
APTT BLD: 26.6 SEC (ref 22.1–36.4)
APTT BLD: 33.8 SEC (ref 22.1–36.4)
DEPRECATED RDW RBC AUTO: 14 % (ref 12.4–15.4)
EKG ATRIAL RATE: 67 BPM
EKG DIAGNOSIS: NORMAL
EKG P AXIS: -3 DEGREES
EKG P-R INTERVAL: 164 MS
EKG Q-T INTERVAL: 426 MS
EKG QRS DURATION: 92 MS
EKG QTC CALCULATION (BAZETT): 450 MS
EKG R AXIS: 28 DEGREES
EKG T AXIS: 48 DEGREES
EKG VENTRICULAR RATE: 67 BPM
GLUCOSE BLD-MCNC: 137 MG/DL (ref 70–99)
HCG SERPL QL: NEGATIVE
HCT VFR BLD AUTO: 37.8 % (ref 36–48)
HGB BLD-MCNC: 12.8 G/DL (ref 12–16)
MCH RBC QN AUTO: 30.4 PG (ref 26–34)
MCHC RBC AUTO-ENTMCNC: 34 G/DL (ref 31–36)
MCV RBC AUTO: 89.2 FL (ref 80–100)
PERFORMED ON: ABNORMAL
PLATELET # BLD AUTO: 242 K/UL (ref 135–450)
PMV BLD AUTO: 9.1 FL (ref 5–10.5)
RBC # BLD AUTO: 4.23 M/UL (ref 4–5.2)
TROPONIN, HIGH SENSITIVITY: 70 NG/L (ref 0–14)
TROPONIN, HIGH SENSITIVITY: 85 NG/L (ref 0–14)
WBC # BLD AUTO: 6 K/UL (ref 4–11)

## 2024-08-03 PROCEDURE — 84703 CHORIONIC GONADOTROPIN ASSAY: CPT

## 2024-08-03 PROCEDURE — 96375 TX/PRO/DX INJ NEW DRUG ADDON: CPT

## 2024-08-03 PROCEDURE — 36415 COLL VENOUS BLD VENIPUNCTURE: CPT

## 2024-08-03 PROCEDURE — 85730 THROMBOPLASTIN TIME PARTIAL: CPT

## 2024-08-03 PROCEDURE — 93010 ELECTROCARDIOGRAM REPORT: CPT | Performed by: INTERNAL MEDICINE

## 2024-08-03 PROCEDURE — 85520 HEPARIN ASSAY: CPT

## 2024-08-03 PROCEDURE — 85027 COMPLETE CBC AUTOMATED: CPT

## 2024-08-03 PROCEDURE — 84484 ASSAY OF TROPONIN QUANT: CPT

## 2024-08-03 PROCEDURE — 6360000002 HC RX W HCPCS

## 2024-08-03 PROCEDURE — 6360000002 HC RX W HCPCS: Performed by: STUDENT IN AN ORGANIZED HEALTH CARE EDUCATION/TRAINING PROGRAM

## 2024-08-03 PROCEDURE — 99223 1ST HOSP IP/OBS HIGH 75: CPT | Performed by: INTERNAL MEDICINE

## 2024-08-03 PROCEDURE — 6370000000 HC RX 637 (ALT 250 FOR IP)

## 2024-08-03 PROCEDURE — 2580000003 HC RX 258

## 2024-08-03 PROCEDURE — 1200000000 HC SEMI PRIVATE

## 2024-08-03 RX ORDER — POTASSIUM CHLORIDE 20 MEQ/1
40 TABLET, EXTENDED RELEASE ORAL PRN
Status: DISCONTINUED | OUTPATIENT
Start: 2024-08-03 | End: 2024-08-06 | Stop reason: HOSPADM

## 2024-08-03 RX ORDER — HEPARIN SODIUM 1000 [USP'U]/ML
4000 INJECTION, SOLUTION INTRAVENOUS; SUBCUTANEOUS ONCE
Status: DISCONTINUED | OUTPATIENT
Start: 2024-08-03 | End: 2024-08-03

## 2024-08-03 RX ORDER — HEPARIN SODIUM 1000 [USP'U]/ML
2000 INJECTION, SOLUTION INTRAVENOUS; SUBCUTANEOUS PRN
Status: DISCONTINUED | OUTPATIENT
Start: 2024-08-03 | End: 2024-08-03

## 2024-08-03 RX ORDER — SODIUM CHLORIDE 0.9 % (FLUSH) 0.9 %
5-40 SYRINGE (ML) INJECTION EVERY 12 HOURS SCHEDULED
Status: DISCONTINUED | OUTPATIENT
Start: 2024-08-03 | End: 2024-08-05 | Stop reason: SDUPTHER

## 2024-08-03 RX ORDER — REGADENOSON 0.08 MG/ML
0.4 INJECTION, SOLUTION INTRAVENOUS
Status: DISCONTINUED | OUTPATIENT
Start: 2024-08-03 | End: 2024-08-06 | Stop reason: HOSPADM

## 2024-08-03 RX ORDER — ASPIRIN 81 MG/1
81 TABLET, CHEWABLE ORAL DAILY
Status: DISCONTINUED | OUTPATIENT
Start: 2024-08-03 | End: 2024-08-06

## 2024-08-03 RX ORDER — METOPROLOL SUCCINATE 50 MG/1
50 TABLET, EXTENDED RELEASE ORAL DAILY
Status: DISCONTINUED | OUTPATIENT
Start: 2024-08-03 | End: 2024-08-06 | Stop reason: HOSPADM

## 2024-08-03 RX ORDER — HEPARIN SODIUM 1000 [USP'U]/ML
4000 INJECTION, SOLUTION INTRAVENOUS; SUBCUTANEOUS ONCE
Status: COMPLETED | OUTPATIENT
Start: 2024-08-03 | End: 2024-08-03

## 2024-08-03 RX ORDER — SODIUM CHLORIDE 0.9 % (FLUSH) 0.9 %
5-40 SYRINGE (ML) INJECTION PRN
Status: DISCONTINUED | OUTPATIENT
Start: 2024-08-03 | End: 2024-08-06 | Stop reason: HOSPADM

## 2024-08-03 RX ORDER — HEPARIN SODIUM 1000 [USP'U]/ML
4000 INJECTION, SOLUTION INTRAVENOUS; SUBCUTANEOUS PRN
Status: DISCONTINUED | OUTPATIENT
Start: 2024-08-03 | End: 2024-08-03

## 2024-08-03 RX ORDER — ONDANSETRON 4 MG/1
4 TABLET, ORALLY DISINTEGRATING ORAL EVERY 8 HOURS PRN
Status: DISCONTINUED | OUTPATIENT
Start: 2024-08-03 | End: 2024-08-06 | Stop reason: HOSPADM

## 2024-08-03 RX ORDER — LOSARTAN POTASSIUM 25 MG/1
25 TABLET ORAL DAILY
Status: DISCONTINUED | OUTPATIENT
Start: 2024-08-03 | End: 2024-08-03

## 2024-08-03 RX ORDER — ACETAMINOPHEN 650 MG/1
650 SUPPOSITORY RECTAL EVERY 6 HOURS PRN
Status: DISCONTINUED | OUTPATIENT
Start: 2024-08-03 | End: 2024-08-06 | Stop reason: HOSPADM

## 2024-08-03 RX ORDER — MAGNESIUM SULFATE IN WATER 40 MG/ML
2000 INJECTION, SOLUTION INTRAVENOUS PRN
Status: DISCONTINUED | OUTPATIENT
Start: 2024-08-03 | End: 2024-08-06 | Stop reason: HOSPADM

## 2024-08-03 RX ORDER — SODIUM CHLORIDE 9 MG/ML
INJECTION, SOLUTION INTRAVENOUS PRN
Status: DISCONTINUED | OUTPATIENT
Start: 2024-08-03 | End: 2024-08-06 | Stop reason: HOSPADM

## 2024-08-03 RX ORDER — POTASSIUM CHLORIDE 7.45 MG/ML
10 INJECTION INTRAVENOUS PRN
Status: DISCONTINUED | OUTPATIENT
Start: 2024-08-03 | End: 2024-08-06 | Stop reason: HOSPADM

## 2024-08-03 RX ORDER — VITAMIN B COMPLEX
3000 TABLET ORAL DAILY
Status: DISCONTINUED | OUTPATIENT
Start: 2024-08-03 | End: 2024-08-06 | Stop reason: HOSPADM

## 2024-08-03 RX ORDER — ONDANSETRON 2 MG/ML
4 INJECTION INTRAMUSCULAR; INTRAVENOUS EVERY 6 HOURS PRN
Status: DISCONTINUED | OUTPATIENT
Start: 2024-08-03 | End: 2024-08-06 | Stop reason: HOSPADM

## 2024-08-03 RX ORDER — HEPARIN SODIUM 10000 [USP'U]/100ML
0-4000 INJECTION, SOLUTION INTRAVENOUS CONTINUOUS
Status: DISCONTINUED | OUTPATIENT
Start: 2024-08-03 | End: 2024-08-05

## 2024-08-03 RX ORDER — MAGNESIUM SULFATE 1 G/100ML
1000 INJECTION INTRAVENOUS ONCE
Status: COMPLETED | OUTPATIENT
Start: 2024-08-03 | End: 2024-08-03

## 2024-08-03 RX ORDER — ACETAMINOPHEN 325 MG/1
650 TABLET ORAL EVERY 6 HOURS PRN
Status: DISCONTINUED | OUTPATIENT
Start: 2024-08-03 | End: 2024-08-06 | Stop reason: SDUPTHER

## 2024-08-03 RX ORDER — POLYETHYLENE GLYCOL 3350 17 G/17G
17 POWDER, FOR SOLUTION ORAL DAILY PRN
Status: DISCONTINUED | OUTPATIENT
Start: 2024-08-03 | End: 2024-08-06 | Stop reason: HOSPADM

## 2024-08-03 RX ORDER — NIFEDIPINE 30 MG/1
30 TABLET, EXTENDED RELEASE ORAL DAILY
Status: DISCONTINUED | OUTPATIENT
Start: 2024-08-03 | End: 2024-08-06 | Stop reason: HOSPADM

## 2024-08-03 RX ADMIN — LOSARTAN POTASSIUM 25 MG: 25 TABLET, FILM COATED ORAL at 11:21

## 2024-08-03 RX ADMIN — SODIUM CHLORIDE, PRESERVATIVE FREE 10 ML: 5 INJECTION INTRAVENOUS at 11:30

## 2024-08-03 RX ADMIN — HEPARIN SODIUM 1000 UNITS/HR: 10000 INJECTION, SOLUTION INTRAVENOUS at 04:00

## 2024-08-03 RX ADMIN — MAGNESIUM SULFATE HEPTAHYDRATE 1000 MG: 1 INJECTION, SOLUTION INTRAVENOUS at 02:49

## 2024-08-03 RX ADMIN — HEPARIN SODIUM 4000 UNITS: 1000 INJECTION INTRAVENOUS; SUBCUTANEOUS at 03:55

## 2024-08-03 RX ADMIN — ASPIRIN 81 MG 81 MG: 81 TABLET ORAL at 14:55

## 2024-08-03 RX ADMIN — NIFEDIPINE 30 MG: 30 TABLET, EXTENDED RELEASE ORAL at 11:21

## 2024-08-03 RX ADMIN — HEPARIN SODIUM 1000 UNITS/HR: 10000 INJECTION, SOLUTION INTRAVENOUS at 09:57

## 2024-08-03 RX ADMIN — HEPARIN SODIUM 4000 UNITS: 1000 INJECTION INTRAVENOUS; SUBCUTANEOUS at 22:11

## 2024-08-03 RX ADMIN — HEPARIN SODIUM 1720 UNITS/HR: 10000 INJECTION, SOLUTION INTRAVENOUS at 22:14

## 2024-08-03 RX ADMIN — HEPARIN SODIUM 4000 UNITS: 1000 INJECTION INTRAVENOUS; SUBCUTANEOUS at 12:58

## 2024-08-03 RX ADMIN — Medication 3000 UNITS: at 11:29

## 2024-08-03 RX ADMIN — METOPROLOL SUCCINATE 50 MG: 50 TABLET, EXTENDED RELEASE ORAL at 13:12

## 2024-08-03 ASSESSMENT — PAIN SCALES - GENERAL
PAINLEVEL_OUTOF10: 0
PAINLEVEL_OUTOF10: 3

## 2024-08-03 ASSESSMENT — PAIN - FUNCTIONAL ASSESSMENT
PAIN_FUNCTIONAL_ASSESSMENT: NONE - DENIES PAIN
PAIN_FUNCTIONAL_ASSESSMENT: NONE - DENIES PAIN

## 2024-08-03 NOTE — ED NOTES
Heparin delayed due to PIV access  
Patient transported to [facility] room 4256 per [facility].  Heparin gtt @ 1000 units/hr 10 ml/hr  
Pt agitated and refusing blood samples being taken at this time. Pt has told this nurse that she is not allowed to attempt to obtain blood samples from her again.   
Pt agrees to let a different nurse attempt IV access and blood samples. Another nurse to attempt  
Pt asking for water. Pt told that she cannot have anything to eat or drink until cleared by the doctor to do so. Pt states that this nurse is not going to stop her from getting water if she wants water. Pt educated on why it is important to remain NPO until cleared by a doctor. Pt disagrees with risk posed and states she will drink if she wants to.   
Report given to Alexia HANKS  
[facility] transport here to transport patient to [facility] room 4256.  Report given  
There are no Wet Read(s) to document.

## 2024-08-03 NOTE — ED TRIAGE NOTES
Pt states chest pain and SOB since 2 days ago along with excessive belching and swollen ankles and pain in her back. Pt is very anxious and agitated and aggressive with nursing staff. Pt states she has not been taking her blood pressure medications because they make her urinate a lot. Pt Aox4 and ambulatory

## 2024-08-03 NOTE — CONSULTS
Referring Physician: * No referring provider recorded for this case *  Reason for Consultation: NSTEMI  Chief Complaint: I am having chest pain for last 3 days      Subjective:   History of Present Illness:  Ivania Jenkins is a 44 y.o. patient with prior history of hypertension, hyperlipidemia who presented to the hospital with complaints of epigastric and lower chest pain radiating to her back for last 3 days.  She attributed this pain to her indigestion and took Tums with some relief of her symptoms.  She did complain of mild shortness of breath but had no diaphoresis or radiation of this pain to her jaws or to her arms.  Since chest pain kept coming back, she decided to come to the hospital  In ER her EKG showed no acute changes but her troponin was elevated and she has been admitted for further evaluation and treatment.  She currently denies having any discomfort in the chest or in the back    I have been asked to provide consultation regarding further management and testing.    Review of Systems:   All 12 point review of symptoms completed. Pertinent positives identified in the HPI, all other review of symptoms negative as below.    Past Medical History:   has a past medical history of Hypertension.    Surgical History:   has a past surgical history that includes Skin lesion excision (N/A, 4/11/2023).     Social History:   reports that she has been smoking cigarettes. She has never used smokeless tobacco. She reports current alcohol use.     Family History:  family history includes Heart Failure in her paternal grandfather; Kidney Disease in her paternal grandfather.      Home Medications:  Were reviewed and are listed in nursing record and/or below  Prior to Admission medications    Medication Sig Start Date End Date Taking? Authorizing Provider   varenicline (CHANTIX) 1 MG tablet Take 1 tablet by mouth 2 times daily 5/27/24   Kimberly Rodriguez MD   Semaglutide-Weight Management (WEGOVY) 0.5

## 2024-08-03 NOTE — H&P
V2.0  History and Physical      Name:  Ivania Jenkins /Age/Sex: 1980  (44 y.o. female)   MRN & CSN:  [id number] & [id number] Encounter Date/Time: 8/3/2024 12:23 PM EDT   Location:  [id number]-6273/4256-01 PCP: Kimberly Rodriguez MD       Hospital Day: 2    Assessment and Plan:   Ivnaia Jenkins is a 44 y.o. female with a pmh of hypertension who presents with NSTEMI (non-ST elevated myocardial infarction) ([facility])    Hospital Problems             Last Modified POA    * (Principal) NSTEMI (non-ST elevated myocardial infarction) ([facility]) 8/3/2024 Yes       Plan:  NSTEMI : Troponins elevated with negative EKG. On heparin drip. Multiple risk factors. Ordered stress test, cardio consulted  Hypertension : Resume home medications  Obesity likely contributing to above mentioned conditions  DVT prophylaxis : Lovenox        Diet Diet NPO  Diet NPO   DVT Prophylaxis [] Lovenox, []  Heparin, [] SCDs, [] Ambulation,  [] Eliquis, [] Xarelto, [] Coumadin   Code Status Full Code       History from:     patient    History of Present Illness:       Ivania Jenkins is a 44 y.o. female with pmh of hypertension who presents with chest pain and shortness of breath for last 3 days. Reports a squeezing chest pain that radiated from her substernal region to her back which is still about the same intensity. Reports being a smoker and takes medication for hypertnesion. Denies any abdominal pain, dizziness, syncope     Review of Systems:        Pertinent positives and negatives discussed in HPI     Objective:   No intake or output data in the 24 hours ending 24 1223   Vitals:   Vitals:    24 0400 24 0515 24 0907 24 1053   BP: 136/65 124/75 (!) 131/108 (!) 156/79   Pulse: 60 62 67 58   Resp: 19 16 16 18   Temp:   97.8 °F (36.6 °C) 97.9 °F (36.6 °C)   TempSrc:   Oral Oral   SpO2: 97% 97% 98% 100%   Weight:       Height:           Medications Prior to Admission     Prior to Admission medications    Medication Sig

## 2024-08-03 NOTE — PLAN OF CARE
Nuclear stress test cannot be performed today 8/3 due to being out of doses. Nuclear stress test can be performed morning of 8/5 or scheduled as an outpatient.     Prep:   NPO 6 hours prior to test   No caffeine or decaffeinated 12 hours prior to exam   No beta blockers 24-48 hours before exam   No inhalers morning of exam, but bring with you   No Persantine or Theophylline 48 hours before the exam     Exam:   This is a 2.5-3 hour exam   Patient must be able to put both arms above head

## 2024-08-03 NOTE — ED PROVIDER NOTES
[facility]    CHIEF COMPLAINT  Chest Pain and Shortness of Breath (Chest pain and SOB for two days. Pt states she is not taking her blood pressure medications because she is tired of peeing all the time. )       HISTORY OF PRESENT ILLNESS  Ivania Jenkins is a 44 y.o. female presenting to the ED for chest pain and shortness of breath.  Onset of symptoms started 3 days ago.  Patient states she has a squeezing chest pain that radiates from her substernal region to her back.  Patient denies any prior cardiac history.  Patient is currently a smoker and has a past medical history of hypertension and hyperlipidemia.  Patient initially believed chest pain was secondary to indigestion given that she had episodes of belching however she denies any burning or gnawing epigastric pain.  Patient denies abdominal pain.  Patient denies fever, cough, runny nose, dizziness, syncope, vertigo, changes in vision.  Patient rates her chest pain 8 out of 10.  Patient denies any prior history of heart failure or COPD.    - History obtained from: Patient  - Limitations to history: None    I have reviewed the following from the nursing documentation:    Past Medical History:   Diagnosis Date    Hypertension      Past Surgical History:   Procedure Laterality Date    SKIN LESION EXCISION N/A 4/11/2023    EXCISION OF MID BACK MASS performed by Navarro Rollins MD at [facility] OR     Family History   Problem Relation Age of Onset    Heart Failure Paternal Grandfather     Kidney Disease Paternal Grandfather         needed dialysis and refused.     Social History     Socioeconomic History    Marital status: Single     Spouse name: Not on file    Number of children: Not on file    Years of education: Not on file    Highest education level: Not on file   Occupational History    Not on file   Tobacco Use    Smoking status: Every Day     Current packs/day: 0.50     Types: Cigarettes    Smokeless tobacco: Never   Vaping

## 2024-08-03 NOTE — PLAN OF CARE
Problem: Discharge Planning  Goal: Discharge to home or other facility with appropriate resources  Outcome: Progressing  Flowsheets (Taken 8/3/2024 1053)  Discharge to home or other facility with appropriate resources:   Identify barriers to discharge with patient and caregiver   Arrange for needed discharge resources and transportation as appropriate   Identify discharge learning needs (meds, wound care, etc)     Problem: Safety - Adult  Goal: Free from fall injury  Outcome: Progressing  Flowsheets (Taken 8/3/2024 1053)  Free From Fall Injury: Instruct family/caregiver on patient safety     Problem: ABCDS Injury Assessment  Goal: Absence of physical injury  Outcome: Progressing  Flowsheets (Taken 8/3/2024 1053)  Absence of Physical Injury: Implement safety measures based on patient assessment     Problem: Cardiovascular - Adult  Goal: Maintains optimal cardiac output and hemodynamic stability  Outcome: Progressing  Goal: Absence of cardiac dysrhythmias or at baseline  Outcome: Progressing

## 2024-08-04 PROBLEM — I21.4 NON-STEMI (NON-ST ELEVATED MYOCARDIAL INFARCTION) (HCC): Status: ACTIVE | Noted: 2024-08-02

## 2024-08-04 PROBLEM — R07.9 CHEST PAIN: Status: ACTIVE | Noted: 2024-08-04

## 2024-08-04 LAB
ALBUMIN SERPL-MCNC: 4 G/DL (ref 3.4–5)
ALP SERPL-CCNC: 72 U/L (ref 40–129)
ALT SERPL-CCNC: 12 U/L (ref 10–40)
ANION GAP SERPL CALCULATED.3IONS-SCNC: 12 MMOL/L (ref 3–16)
ANTI-XA UNFRAC HEPARIN: 0.27 IU/ML (ref 0.3–0.7)
ANTI-XA UNFRAC HEPARIN: 0.54 IU/ML (ref 0.3–0.7)
AST SERPL-CCNC: 12 U/L (ref 15–37)
BASOPHILS # BLD: 0.1 K/UL (ref 0–0.2)
BASOPHILS NFR BLD: 2 %
BILIRUB SERPL-MCNC: 0.3 MG/DL (ref 0–1)
BUN SERPL-MCNC: 11 MG/DL (ref 7–20)
CALCIUM SERPL-MCNC: 9.1 MG/DL (ref 8.3–10.6)
CHLORIDE SERPL-SCNC: 102 MMOL/L (ref 99–110)
CO2 SERPL-SCNC: 25 MMOL/L (ref 21–32)
CREAT SERPL-MCNC: 0.8 MG/DL (ref 0.6–1.1)
DEPRECATED RDW RBC AUTO: 14 % (ref 12.4–15.4)
EOSINOPHIL # BLD: 0.3 K/UL (ref 0–0.6)
EOSINOPHIL NFR BLD: 4.1 %
GFR SERPLBLD CREATININE-BSD FMLA CKD-EPI: >90 ML/MIN/{1.73_M2}
GLUCOSE SERPL-MCNC: 94 MG/DL (ref 70–99)
HCT VFR BLD AUTO: 42.2 % (ref 36–48)
HGB BLD-MCNC: 13.9 G/DL (ref 12–16)
LYMPHOCYTES # BLD: 3.2 K/UL (ref 1–5.1)
LYMPHOCYTES NFR BLD: 47.3 %
MCH RBC QN AUTO: 29.8 PG (ref 26–34)
MCHC RBC AUTO-ENTMCNC: 33 G/DL (ref 31–36)
MCV RBC AUTO: 90.4 FL (ref 80–100)
MONOCYTES # BLD: 0.5 K/UL (ref 0–1.3)
MONOCYTES NFR BLD: 7.6 %
NEUTROPHILS # BLD: 2.6 K/UL (ref 1.7–7.7)
NEUTROPHILS NFR BLD: 39 %
PLATELET # BLD AUTO: 283 K/UL (ref 135–450)
PMV BLD AUTO: 9.8 FL (ref 5–10.5)
POTASSIUM SERPL-SCNC: 3.8 MMOL/L (ref 3.5–5.1)
PROT SERPL-MCNC: 6.6 G/DL (ref 6.4–8.2)
RBC # BLD AUTO: 4.67 M/UL (ref 4–5.2)
SODIUM SERPL-SCNC: 139 MMOL/L (ref 136–145)
WBC # BLD AUTO: 6.7 K/UL (ref 4–11)

## 2024-08-04 PROCEDURE — 2580000003 HC RX 258

## 2024-08-04 PROCEDURE — 6370000000 HC RX 637 (ALT 250 FOR IP)

## 2024-08-04 PROCEDURE — 85025 COMPLETE CBC W/AUTO DIFF WBC: CPT

## 2024-08-04 PROCEDURE — 80053 COMPREHEN METABOLIC PANEL: CPT

## 2024-08-04 PROCEDURE — 85520 HEPARIN ASSAY: CPT

## 2024-08-04 PROCEDURE — 1200000000 HC SEMI PRIVATE

## 2024-08-04 PROCEDURE — 99233 SBSQ HOSP IP/OBS HIGH 50: CPT

## 2024-08-04 PROCEDURE — 36415 COLL VENOUS BLD VENIPUNCTURE: CPT

## 2024-08-04 PROCEDURE — 6360000002 HC RX W HCPCS: Performed by: STUDENT IN AN ORGANIZED HEALTH CARE EDUCATION/TRAINING PROGRAM

## 2024-08-04 PROCEDURE — 6360000002 HC RX W HCPCS

## 2024-08-04 RX ORDER — HEPARIN SODIUM 1000 [USP'U]/ML
2000 INJECTION, SOLUTION INTRAVENOUS; SUBCUTANEOUS ONCE
Status: COMPLETED | OUTPATIENT
Start: 2024-08-04 | End: 2024-08-04

## 2024-08-04 RX ADMIN — HEPARIN SODIUM 1720 UNITS/HR: 10000 INJECTION, SOLUTION INTRAVENOUS at 01:44

## 2024-08-04 RX ADMIN — ASPIRIN 81 MG 81 MG: 81 TABLET ORAL at 10:17

## 2024-08-04 RX ADMIN — SODIUM CHLORIDE, PRESERVATIVE FREE 10 ML: 5 INJECTION INTRAVENOUS at 10:16

## 2024-08-04 RX ADMIN — HEPARIN SODIUM 1720 UNITS/HR: 10000 INJECTION, SOLUTION INTRAVENOUS at 18:50

## 2024-08-04 RX ADMIN — HEPARIN SODIUM 2000 UNITS: 1000 INJECTION INTRAVENOUS; SUBCUTANEOUS at 21:47

## 2024-08-04 RX ADMIN — HEPARIN SODIUM 1960 UNITS/HR: 10000 INJECTION, SOLUTION INTRAVENOUS at 21:48

## 2024-08-04 RX ADMIN — Medication 3000 UNITS: at 10:17

## 2024-08-04 RX ADMIN — METOPROLOL SUCCINATE 50 MG: 50 TABLET, EXTENDED RELEASE ORAL at 10:17

## 2024-08-04 RX ADMIN — NIFEDIPINE 30 MG: 30 TABLET, EXTENDED RELEASE ORAL at 10:17

## 2024-08-04 ASSESSMENT — PAIN SCALES - GENERAL
PAINLEVEL_OUTOF10: 0

## 2024-08-04 NOTE — PLAN OF CARE
Problem: Discharge Planning  Goal: Discharge to home or other facility with appropriate resources  8/4/2024 0048 by Eva Espinosa RN  Outcome: Progressing  Flowsheets (Taken 8/3/2024 2017)  Discharge to home or other facility with appropriate resources:   Identify barriers to discharge with patient and caregiver   Identify discharge learning needs (meds, wound care, etc)   Arrange for needed discharge resources and transportation as appropriate  8/3/2024 1240 by Kristine Pennington RN  Outcome: Progressing  Flowsheets (Taken 8/3/2024 1053)  Discharge to home or other facility with appropriate resources:   Identify barriers to discharge with patient and caregiver   Arrange for needed discharge resources and transportation as appropriate   Identify discharge learning needs (meds, wound care, etc)     Problem: Safety - Adult  Goal: Free from fall injury  8/4/2024 0048 by Eva Espinosa RN  Outcome: Progressing  8/3/2024 1240 by Kristine Pennington RN  Outcome: Progressing  Flowsheets (Taken 8/3/2024 1053)  Free From Fall Injury: Instruct family/caregiver on patient safety     Problem: ABCDS Injury Assessment  Goal: Absence of physical injury  8/4/2024 0048 by Eva Espinosa RN  Outcome: Progressing  8/3/2024 1240 by Kristine Pennington RN  Outcome: Progressing  Flowsheets (Taken 8/3/2024 1053)  Absence of Physical Injury: Implement safety measures based on patient assessment     Problem: Cardiovascular - Adult  Goal: Maintains optimal cardiac output and hemodynamic stability  8/4/2024 0048 by Eva Espinosa RN  Outcome: Progressing  Flowsheets (Taken 8/3/2024 2017)  Maintains optimal cardiac output and hemodynamic stability:   Monitor blood pressure and heart rate   Assess for signs of decreased cardiac output  8/3/2024 1240 by Kristine Pennington RN  Outcome: Progressing  Goal: Absence of cardiac dysrhythmias or at baseline  8/4/2024 0048 by Eva Espinosa RN  Outcome: Progressing  Flowsheets (Taken 8/3/2024 2017)  Absence

## 2024-08-04 NOTE — PLAN OF CARE
Problem: Discharge Planning  Goal: Discharge to home or other facility with appropriate resources  Outcome: Progressing  Flowsheets (Taken 8/4/2024 0822)  Discharge to home or other facility with appropriate resources:   Arrange for needed discharge resources and transportation as appropriate   Identify barriers to discharge with patient and caregiver   Identify discharge learning needs (meds, wound care, etc)     Problem: Safety - Adult  Goal: Free from fall injury  Outcome: Progressing  Flowsheets (Taken 8/4/2024 0822)  Free From Fall Injury: Instruct family/caregiver on patient safety     Problem: ABCDS Injury Assessment  Goal: Absence of physical injury  Outcome: Progressing  Flowsheets (Taken 8/4/2024 0822)  Absence of Physical Injury: Implement safety measures based on patient assessment     Problem: Cardiovascular - Adult  Goal: Maintains optimal cardiac output and hemodynamic stability  Outcome: Progressing  Flowsheets (Taken 8/4/2024 0822)  Maintains optimal cardiac output and hemodynamic stability: Monitor blood pressure and heart rate  Goal: Absence of cardiac dysrhythmias or at baseline  Outcome: Progressing

## 2024-08-05 LAB
ANION GAP SERPL CALCULATED.3IONS-SCNC: 14 MMOL/L (ref 3–16)
ANTI-XA UNFRAC HEPARIN: 0.54 IU/ML (ref 0.3–0.7)
BUN SERPL-MCNC: 13 MG/DL (ref 7–20)
CALCIUM SERPL-MCNC: 9.1 MG/DL (ref 8.3–10.6)
CHLORIDE SERPL-SCNC: 100 MMOL/L (ref 99–110)
CO2 SERPL-SCNC: 22 MMOL/L (ref 21–32)
CREAT SERPL-MCNC: 0.8 MG/DL (ref 0.6–1.1)
DEPRECATED RDW RBC AUTO: 14 % (ref 12.4–15.4)
ECHO BSA: 2.46 M2
GFR SERPLBLD CREATININE-BSD FMLA CKD-EPI: >90 ML/MIN/{1.73_M2}
GLUCOSE SERPL-MCNC: 113 MG/DL (ref 70–99)
HCT VFR BLD AUTO: 40.9 % (ref 36–48)
HGB BLD-MCNC: 13.9 G/DL (ref 12–16)
MCH RBC QN AUTO: 30.6 PG (ref 26–34)
MCHC RBC AUTO-ENTMCNC: 34.1 G/DL (ref 31–36)
MCV RBC AUTO: 89.8 FL (ref 80–100)
PLATELET # BLD AUTO: 263 K/UL (ref 135–450)
PMV BLD AUTO: 9.8 FL (ref 5–10.5)
POTASSIUM SERPL-SCNC: 4.1 MMOL/L (ref 3.5–5.1)
RBC # BLD AUTO: 4.55 M/UL (ref 4–5.2)
SODIUM SERPL-SCNC: 136 MMOL/L (ref 136–145)
WBC # BLD AUTO: 7.2 K/UL (ref 4–11)

## 2024-08-05 PROCEDURE — 93458 L HRT ARTERY/VENTRICLE ANGIO: CPT | Performed by: INTERNAL MEDICINE

## 2024-08-05 PROCEDURE — 7100000011 HC PHASE II RECOVERY - ADDTL 15 MIN: Performed by: INTERNAL MEDICINE

## 2024-08-05 PROCEDURE — 2580000003 HC RX 258: Performed by: INTERNAL MEDICINE

## 2024-08-05 PROCEDURE — 94760 N-INVAS EAR/PLS OXIMETRY 1: CPT

## 2024-08-05 PROCEDURE — 6370000000 HC RX 637 (ALT 250 FOR IP)

## 2024-08-05 PROCEDURE — 6360000002 HC RX W HCPCS: Performed by: INTERNAL MEDICINE

## 2024-08-05 PROCEDURE — C1769 GUIDE WIRE: HCPCS | Performed by: INTERNAL MEDICINE

## 2024-08-05 PROCEDURE — 2709999900 HC NON-CHARGEABLE SUPPLY: Performed by: INTERNAL MEDICINE

## 2024-08-05 PROCEDURE — 2580000003 HC RX 258

## 2024-08-05 PROCEDURE — 6370000000 HC RX 637 (ALT 250 FOR IP): Performed by: INTERNAL MEDICINE

## 2024-08-05 PROCEDURE — 2500000003 HC RX 250 WO HCPCS: Performed by: INTERNAL MEDICINE

## 2024-08-05 PROCEDURE — 80048 BASIC METABOLIC PNL TOTAL CA: CPT

## 2024-08-05 PROCEDURE — 1200000000 HC SEMI PRIVATE

## 2024-08-05 PROCEDURE — B2111ZZ FLUOROSCOPY OF MULTIPLE CORONARY ARTERIES USING LOW OSMOLAR CONTRAST: ICD-10-PCS | Performed by: INTERNAL MEDICINE

## 2024-08-05 PROCEDURE — 36415 COLL VENOUS BLD VENIPUNCTURE: CPT

## 2024-08-05 PROCEDURE — 6360000004 HC RX CONTRAST MEDICATION: Performed by: INTERNAL MEDICINE

## 2024-08-05 PROCEDURE — B2151ZZ FLUOROSCOPY OF LEFT HEART USING LOW OSMOLAR CONTRAST: ICD-10-PCS | Performed by: INTERNAL MEDICINE

## 2024-08-05 PROCEDURE — 7100000010 HC PHASE II RECOVERY - FIRST 15 MIN: Performed by: INTERNAL MEDICINE

## 2024-08-05 PROCEDURE — 99152 MOD SED SAME PHYS/QHP 5/>YRS: CPT | Performed by: INTERNAL MEDICINE

## 2024-08-05 PROCEDURE — 4A023N7 MEASUREMENT OF CARDIAC SAMPLING AND PRESSURE, LEFT HEART, PERCUTANEOUS APPROACH: ICD-10-PCS | Performed by: INTERNAL MEDICINE

## 2024-08-05 PROCEDURE — C1894 INTRO/SHEATH, NON-LASER: HCPCS | Performed by: INTERNAL MEDICINE

## 2024-08-05 PROCEDURE — 85520 HEPARIN ASSAY: CPT

## 2024-08-05 PROCEDURE — 85027 COMPLETE CBC AUTOMATED: CPT

## 2024-08-05 RX ORDER — SODIUM CHLORIDE 0.9 % (FLUSH) 0.9 %
5-40 SYRINGE (ML) INJECTION EVERY 12 HOURS SCHEDULED
Status: DISCONTINUED | OUTPATIENT
Start: 2024-08-05 | End: 2024-08-06 | Stop reason: HOSPADM

## 2024-08-05 RX ORDER — SODIUM CHLORIDE 0.9 % (FLUSH) 0.9 %
5-40 SYRINGE (ML) INJECTION PRN
Status: DISCONTINUED | OUTPATIENT
Start: 2024-08-05 | End: 2024-08-06 | Stop reason: HOSPADM

## 2024-08-05 RX ORDER — SODIUM CHLORIDE 0.9 % (FLUSH) 0.9 %
5-40 SYRINGE (ML) INJECTION EVERY 12 HOURS SCHEDULED
Status: DISCONTINUED | OUTPATIENT
Start: 2024-08-05 | End: 2024-08-05 | Stop reason: SDUPTHER

## 2024-08-05 RX ORDER — FENTANYL CITRATE 50 UG/ML
INJECTION, SOLUTION INTRAMUSCULAR; INTRAVENOUS PRN
Status: DISCONTINUED | OUTPATIENT
Start: 2024-08-05 | End: 2024-08-05 | Stop reason: HOSPADM

## 2024-08-05 RX ORDER — MIDAZOLAM HYDROCHLORIDE 1 MG/ML
INJECTION INTRAMUSCULAR; INTRAVENOUS PRN
Status: DISCONTINUED | OUTPATIENT
Start: 2024-08-05 | End: 2024-08-05 | Stop reason: HOSPADM

## 2024-08-05 RX ORDER — SODIUM CHLORIDE 9 MG/ML
INJECTION, SOLUTION INTRAVENOUS PRN
Status: DISCONTINUED | OUTPATIENT
Start: 2024-08-05 | End: 2024-08-06 | Stop reason: HOSPADM

## 2024-08-05 RX ORDER — ASPIRIN 81 MG/1
243 TABLET, CHEWABLE ORAL ONCE
Status: COMPLETED | OUTPATIENT
Start: 2024-08-05 | End: 2024-08-05

## 2024-08-05 RX ORDER — ACETAMINOPHEN 325 MG/1
650 TABLET ORAL EVERY 4 HOURS PRN
Status: DISCONTINUED | OUTPATIENT
Start: 2024-08-05 | End: 2024-08-06 | Stop reason: HOSPADM

## 2024-08-05 RX ADMIN — Medication 3000 UNITS: at 08:41

## 2024-08-05 RX ADMIN — ASPIRIN 81 MG 81 MG: 81 TABLET ORAL at 08:42

## 2024-08-05 RX ADMIN — METOPROLOL SUCCINATE 50 MG: 50 TABLET, EXTENDED RELEASE ORAL at 08:41

## 2024-08-05 RX ADMIN — SODIUM CHLORIDE, PRESERVATIVE FREE 10 ML: 5 INJECTION INTRAVENOUS at 08:43

## 2024-08-05 RX ADMIN — NIFEDIPINE 30 MG: 30 TABLET, EXTENDED RELEASE ORAL at 08:42

## 2024-08-05 RX ADMIN — SODIUM CHLORIDE: 9 INJECTION, SOLUTION INTRAVENOUS at 10:16

## 2024-08-05 RX ADMIN — SODIUM CHLORIDE, PRESERVATIVE FREE 10 ML: 5 INJECTION INTRAVENOUS at 20:20

## 2024-08-05 RX ADMIN — ASPIRIN 243 MG: 81 TABLET, CHEWABLE ORAL at 10:15

## 2024-08-05 ASSESSMENT — PAIN SCALES - GENERAL
PAINLEVEL_OUTOF10: 4
PAINLEVEL_OUTOF10: 0
PAINLEVEL_OUTOF10: 0

## 2024-08-05 ASSESSMENT — PAIN DESCRIPTION - LOCATION: LOCATION: THROAT

## 2024-08-05 NOTE — PLAN OF CARE
Problem: Discharge Planning  Goal: Discharge to home or other facility with appropriate resources  8/5/2024 0548 by Eva Espinosa RN  Outcome: Progressing  Flowsheets (Taken 8/4/2024 2000)  Discharge to home or other facility with appropriate resources:   Identify barriers to discharge with patient and caregiver   Identify discharge learning needs (meds, wound care, etc)   Arrange for needed discharge resources and transportation as appropriate  8/4/2024 1721 by Kristine Pennington RN  Outcome: Progressing  Flowsheets (Taken 8/4/2024 0822)  Discharge to home or other facility with appropriate resources:   Arrange for needed discharge resources and transportation as appropriate   Identify barriers to discharge with patient and caregiver   Identify discharge learning needs (meds, wound care, etc)     Problem: Safety - Adult  Goal: Free from fall injury  8/5/2024 0548 by Eva Espinosa RN  Outcome: Progressing  8/4/2024 1721 by Kristine Pennington RN  Outcome: Progressing  Flowsheets (Taken 8/4/2024 0822)  Free From Fall Injury: Instruct family/caregiver on patient safety     Problem: ABCDS Injury Assessment  Goal: Absence of physical injury  8/5/2024 0548 by Eva Espinosa RN  Outcome: Progressing  8/4/2024 1721 by Kristine Pennington RN  Outcome: Progressing  Flowsheets (Taken 8/4/2024 0822)  Absence of Physical Injury: Implement safety measures based on patient assessment     Problem: Cardiovascular - Adult  Goal: Maintains optimal cardiac output and hemodynamic stability  8/5/2024 0548 by Eva Espinosa RN  Outcome: Progressing  Flowsheets (Taken 8/4/2024 2000)  Maintains optimal cardiac output and hemodynamic stability:   Monitor blood pressure and heart rate   Assess for signs of decreased cardiac output  8/4/2024 1721 by Kristine Pennington RN  Outcome: Progressing  Flowsheets (Taken 8/4/2024 0822)  Maintains optimal cardiac output and hemodynamic stability: Monitor blood pressure and heart rate  Goal: Absence of

## 2024-08-05 NOTE — CARE COORDINATION
Discharge Planning:      (CM) reviewed the patient's chart to assess needs. Patient's Readmission Risk Score is  6% . Patient's medical insurance is  Payor: [facility] / Plan: [facility] MEDICAID / Product Type: *No Product type* / .  Patient's PCP is Kimberly Rodriguez MD .  No needs anticipated, at this time. CM team to follow. Staff to inform CM if additional discharge needs arise.    Pts preferred pharmacy is   [id number] [place], [place] - [address] - P [phone] - F [phone]  [address] [address]  Phone: [phone] Fax: [phone]    Please consult SW/CM if a d/c need should arise.  RAMÍREZ Bermeo  [phone]  Electronically signed by RAMÍREZ Waite on 8/5/2024 at 8:02 AM

## 2024-08-05 NOTE — DISCHARGE INSTRUCTIONS
CARDIAC ANGIOGRAM (LEFT HEART CATH) - RADIAL ACCESS    Care of your puncture site:  Remove clear bandage 24 hours after the procedure.  May shower in 24 hours  Inspect the site daily and gently clean using soap and water.  Dry thoroughly and apply a Band-Aid.    Normal Observations:  Soreness or tenderness which may last one week.  Mild oozing from the incision site.  Possible bruising that could last 2 weeks.    Activity:  You may resume driving 24 hours following the procedure.  You may resume normal activity in 3 days or after the wound heals.  Avoid lifting more than 10 pounds for 3 days with affected arm.  Do not make important / legal decisions within 24 hours after procedure.    Nutrition:  Regular diet  Drink at least 8 to 10 glasses of decaffeinated, non-alcoholic fluid for the next 24 hours to flush the x-ray dye used for your angiogram out of your body.    Call your doctor immediately if your condition worsens, for any other concerns, for a follow-up appointment or if you experience any of the following:  Significant bleeding that does not stop after 10 minutes of applying firm pressure on the puncture site.  Increased swelling of the wrist.  Unusual pain, numbness, or tingling of the wrist/arm.   Any signs of infection such as: redness, yellow drainage at the site, swelling or pain.  IF experiencing any recurrent chest pain, arm or jaw pain, shortness of breath,sweating,nausea & vomiting, lighteheadedness or sudden weak.      Other Instructions:  Hold Metformin or Metformin containing drugs for 48 hours after procedure.

## 2024-08-05 NOTE — PLAN OF CARE
Problem: Discharge Planning  Goal: Discharge to home or other facility with appropriate resources  8/5/2024 1600 by Kristine Pennington RN  Outcome: Progressing  Flowsheets (Taken 8/5/2024 0835)  Discharge to home or other facility with appropriate resources:   Identify barriers to discharge with patient and caregiver   Arrange for needed discharge resources and transportation as appropriate   Identify discharge learning needs (meds, wound care, etc)     Problem: Safety - Adult  Goal: Free from fall injury  8/5/2024 1600 by Kristine Pennington RN  Outcome: Progressing  Flowsheets (Taken 8/5/2024 0835)  Free From Fall Injury: Instruct family/caregiver on patient safety     Problem: ABCDS Injury Assessment  Goal: Absence of physical injury  8/5/2024 1600 by Kristine Pennington RN  Outcome: Progressing  Flowsheets (Taken 8/5/2024 0835)  Absence of Physical Injury: Implement safety measures based on patient assessment     Problem: Cardiovascular - Adult  Goal: Maintains optimal cardiac output and hemodynamic stability  8/5/2024 1600 by Kristine Pennington RN  Outcome: Progressing  Flowsheets (Taken 8/5/2024 0835)  Maintains optimal cardiac output and hemodynamic stability:   Monitor blood pressure and heart rate   Assess for signs of decreased cardiac output  Goal: Absence of cardiac dysrhythmias or at baseline  8/5/2024 1600 by Kristine Pennington RN  Outcome: Progressing  Flowsheets (Taken 8/5/2024 0835)  Absence of cardiac dysrhythmias or at baseline:   Monitor cardiac rate and rhythm   Assess for signs of decreased cardiac output

## 2024-08-05 NOTE — ANESTHESIA PRE-OP
Brief Pre-Op Note/Sedation Assessment      Ivania Jenkins  1980  [id number]  12:58 PM    Planned Procedure: Cardiac Catheterization Procedure  Post Procedure Plan: Return to same level of care  Consent: I have discussed with the patient and/or the patient representative the indication, alternatives, and the possible risks and/or complications of the planned procedure and the anesthesia methods. The patient and/or patient representative appear to understand and agree to proceed.        Chief Complaint:   NSTEMI      Indications for Cath Procedure:  Presentation:  ACS > 24 hrs  2.  Anginal Classification within 2 weeks:  CCS III - Symptoms with everyday living activities, i.e., moderate limitation  3.  Angina Symptoms Assessment:  Typical Chest Pain  4.  Heart Failure Class within last 2 weeks:  No symptoms  5.  Cardiovascular Instability:  No    Prior Ischemic Workup/Eval:  Pre-Procedural Medications: Yes: Aspirin, Beta Blockers, and STATIN  2.   Stress Test Completed?  No    Does Patient need surgery?  Cath Valve Surgery:  No    Pre-Procedure Medical History:  Vital Signs:  /83   Pulse 57   Temp 98 °F (36.7 °C) (Oral)   Resp 17   Ht 1.753 m (5' 9\")   Wt 121.3 kg (267 lb 6.7 oz)   SpO2 98%   BMI 39.49 kg/m²     Allergies:    Allergies   Allergen Reactions    Adhesive Tape      Medications:    Current Facility-Administered Medications   Medication Dose Route Frequency Provider Last Rate Last Admin    sodium chloride flush 0.9 % injection 5-40 mL  5-40 mL IntraVENous 2 times per day Destiney Guzman APRN - CNP        sodium chloride flush 0.9 % injection 5-40 mL  5-40 mL IntraVENous PRN Destiney Guzman APRN - CNP        0.9 % sodium chloride infusion   IntraVENous PRN Destiney Guzman APRN - CNP 75 mL/hr at 08/05/24 1016 New Bag at 08/05/24 1016    midazolam (VERSED) injection   IntraVENous PRN Deric Ware MD   1 mg at 08/05/24 1257    fentaNYL (SUBLIMAZE) injection   IntraVENous PRN

## 2024-08-06 VITALS
HEART RATE: 79 BPM | WEIGHT: 267.2 LBS | BODY MASS INDEX: 39.58 KG/M2 | TEMPERATURE: 97.8 F | HEIGHT: 69 IN | RESPIRATION RATE: 16 BRPM | DIASTOLIC BLOOD PRESSURE: 74 MMHG | OXYGEN SATURATION: 97 % | SYSTOLIC BLOOD PRESSURE: 111 MMHG

## 2024-08-06 PROCEDURE — 99232 SBSQ HOSP IP/OBS MODERATE 35: CPT | Performed by: NURSE PRACTITIONER

## 2024-08-06 PROCEDURE — 2580000003 HC RX 258: Performed by: INTERNAL MEDICINE

## 2024-08-06 PROCEDURE — 6370000000 HC RX 637 (ALT 250 FOR IP): Performed by: INTERNAL MEDICINE

## 2024-08-06 PROCEDURE — 94760 N-INVAS EAR/PLS OXIMETRY 1: CPT

## 2024-08-06 RX ORDER — METOPROLOL SUCCINATE 50 MG/1
50 TABLET, EXTENDED RELEASE ORAL DAILY
Qty: 30 TABLET | Refills: 3 | Status: SHIPPED | OUTPATIENT
Start: 2024-08-07

## 2024-08-06 RX ADMIN — METOPROLOL SUCCINATE 50 MG: 50 TABLET, EXTENDED RELEASE ORAL at 08:13

## 2024-08-06 RX ADMIN — SODIUM CHLORIDE, PRESERVATIVE FREE 10 ML: 5 INJECTION INTRAVENOUS at 08:15

## 2024-08-06 RX ADMIN — Medication 3000 UNITS: at 08:13

## 2024-08-06 RX ADMIN — NIFEDIPINE 30 MG: 30 TABLET, EXTENDED RELEASE ORAL at 08:13

## 2024-08-06 RX ADMIN — ASPIRIN 81 MG 81 MG: 81 TABLET ORAL at 08:13

## 2024-08-06 NOTE — CARE COORDINATION
Discharge order noted. No needs per medical team.     Respectfully submitted,    Rhea ELMORE, SCOTTIES  [facility]   [phone]    Electronically signed by RAMÍREZ Ro, LSW on 8/6/2024 at 12:06 PM

## 2024-08-06 NOTE — PLAN OF CARE
Problem: Discharge Planning  Goal: Discharge to home or other facility with appropriate resources  8/6/2024 0825 by Sarah Parada RN  Outcome: Progressing  8/6/2024 0034 by Diann Thorpe RN  Outcome: Progressing     Problem: Safety - Adult  Goal: Free from fall injury  8/6/2024 0825 by Sarah Parada RN  Outcome: Progressing  8/6/2024 0034 by Diann Thorpe RN  Outcome: Progressing     Problem: ABCDS Injury Assessment  Goal: Absence of physical injury  8/6/2024 0825 by Sarah Parada RN  Outcome: Progressing  8/6/2024 0034 by Diann Thorpe RN  Outcome: Progressing     Problem: Cardiovascular - Adult  Goal: Maintains optimal cardiac output and hemodynamic stability  8/6/2024 0825 by Sarah Parada RN  Outcome: Progressing  8/6/2024 0034 by Diann Thorpe RN  Outcome: Progressing  Goal: Absence of cardiac dysrhythmias or at baseline  8/6/2024 0825 by Sarah Parada RN  Outcome: Progressing  8/6/2024 0034 by Diann Thorpe RN  Outcome: Progressing

## 2024-08-06 NOTE — PLAN OF CARE
Problem: Discharge Planning  Goal: Discharge to home or other facility with appropriate resources  8/6/2024 0034 by Diann Thorpe RN  Outcome: Progressing  8/5/2024 1600 by Kristine Pennington RN  Outcome: Progressing  Flowsheets (Taken 8/5/2024 0835)  Discharge to home or other facility with appropriate resources:   Identify barriers to discharge with patient and caregiver   Arrange for needed discharge resources and transportation as appropriate   Identify discharge learning needs (meds, wound care, etc)     Problem: Safety - Adult  Goal: Free from fall injury  8/6/2024 0034 by Diann Thorpe RN  Outcome: Progressing  8/5/2024 1600 by Kristine Pennington RN  Outcome: Progressing  Flowsheets (Taken 8/5/2024 0835)  Free From Fall Injury: Instruct family/caregiver on patient safety     Problem: ABCDS Injury Assessment  Goal: Absence of physical injury  8/6/2024 0034 by Diann Thorpe RN  Outcome: Progressing  8/5/2024 1600 by Kristine Pennington RN  Outcome: Progressing  Flowsheets (Taken 8/5/2024 0835)  Absence of Physical Injury: Implement safety measures based on patient assessment     Problem: Cardiovascular - Adult  Goal: Maintains optimal cardiac output and hemodynamic stability  8/6/2024 0034 by Diann Thorpe RN  Outcome: Progressing  8/5/2024 1600 by Kristine Pennington RN  Outcome: Progressing  Flowsheets (Taken 8/5/2024 0835)  Maintains optimal cardiac output and hemodynamic stability:   Monitor blood pressure and heart rate   Assess for signs of decreased cardiac output  Goal: Absence of cardiac dysrhythmias or at baseline  8/6/2024 0034 by Diann Thorpe RN  Outcome: Progressing  8/5/2024 1600 by Kristine Pennington RN  Outcome: Progressing  Flowsheets (Taken 8/5/2024 0835)  Absence of cardiac dysrhythmias or at baseline:   Monitor cardiac rate and rhythm   Assess for signs of decreased cardiac output

## 2024-08-06 NOTE — PROGRESS NOTES
[facility]   Daily Progress Note      Admit Date:  8/2/2024    CC: chest pain    HPI:   Ivania Jenkins is a 44 y.o. female with PMH HTN, HLP.    Presented to [facility] with complaints of epigastric and lower chest pain radiating to her back x 3 days.  Associated with mild shortness of breath. she attributed her pain to indigestion and took Tums with minimal relief of her symptoms.  Denied nausea vomiting diaphoresis or radiating pain    ED workup revealed elevated troponin.  EKG without ischemic changes.   LHC- norm cors but had elevated LVEDP- received lasix.     Today she is laying flat in bed.   Denies SOB or CP with activity.   R wrist site CDI, no numbness/tingling.    Review of Systems:   General: Denies fever, chills, fatigue, weakness  Skin: Denies skin changes, rash, itching, lesions.  HEENT: Denies headache, dizziness, vision changes, nosebleeds, sore throat, nasal drainage  RESP: Denies cough, sputum, dyspnea, wheeze, snoring  CARD: Denies palpitations,  murmur  GI:Denies nausea, vomiting, heartburn, loss of appetite, change in bowels  : Denies frequency, pain, incontinence, polyuria  VASC: Denies claudication, leg cramps, clots  MUSC/SKEL: Denies pain, stiffness, arthritis  PSYCH: Denies anxiety, depression, stress  NEURO: Denies numbness, tingling, weakness,change in mood or memory  HEME: Denies abn bruising, bleeding, anemia  ENDO: Denies intolerance to heat, cold, excessive thirst or hunger, hx thyroid disease    Objective:   /74   Pulse 79   Temp 97.8 °F (36.6 °C) (Oral)   Resp 16   Ht 1.753 m (5' 9\")   Wt 121.2 kg (267 lb 3.2 oz)   SpO2 97%   BMI 39.46 kg/m²         Intake/Output Summary (Last 24 hours) at 8/6/2024 0814  Last data filed at 8/6/2024 0647  Gross per 24 hour   Intake 480 ml   Output 5 ml   Net 475 ml     I/O since adm:     WEIGHT:Admit Weight - Scale: 119.7 kg (263 lb 14.3 oz)         Today  Weight - Scale: 121.2 kg (267 lb 3.2 oz)   DRY 
    V2.0    [facility] Progress Note      Name:  Ivania Jenkins /Age/Sex: 1980  (44 y.o. female)   MRN & CSN:  [id number] & [id number] Encounter Date/Time: 2024 1:36 PM EDT   Location:  [id number] PCP: Kimberly Rodriguez MD     Attending:Bonnie Zhao MD       Hospital Day: 3    Assessment and Recommendations   Ivania Jenkins is a 44 y.o. female who presents with NSTEMI (non-ST elevated myocardial infarction) (HCC)      Plan:     NSTEMI : Troponins elevated with negative EKG. On heparin drip. Multiple risk factors. Cardio following. Plan  for angiography tomorrow. Continue aspirin and beta blocker  Hypertension : Resume home medications except losartam. Stared on low dose Toprol  Obesity likely contributing to above mentioned conditions  DVT prophylaxis : Lovenox      Diet Diet NPO  ADULT DIET; Regular; Low Sodium (2 gm)   DVT Prophylaxis [] Lovenox, []  Heparin, [] SCDs, [] Ambulation,  [] Eliquis, [] Xarelto  [] Coumadin   Code Status Full Code         Subjective:     Chief Complaint:     Still reports some chest soreness but denies any active chest pain      Review of Systems:      Pertinent positives and negatives discussed in HPI    Objective:     Intake/Output Summary (Last 24 hours) at 2024 1336  Last data filed at 2024 1156  Gross per 24 hour   Intake 1671.82 ml   Output --   Net 1671.82 ml      Vitals:   Vitals:    24 0526 24 0723 24 1016 24 1156   BP:  135/76 (!) 153/86 127/71   Pulse:  60 60 56   Resp:  16  16   Temp:  98.3 °F (36.8 °C)  98.3 °F (36.8 °C)   TempSrc:  Oral  Oral   SpO2:  97%  99%   Weight: 124.1 kg (273 lb 9.5 oz)      Height: 1.753 m (5' 9\")            Physical Exam:      General: NAD  Eyes: EOMI  ENT: neck supple  Cardiovascular: Regular rate.  Respiratory: Clear to auscultation  Gastrointestinal: Soft, non tender  Genitourinary: no suprapubic tenderness  Musculoskeletal: No edema  Skin: warm, dry  Neuro: Alert.  Psych: Mood appropriate. 
  Patient alert and oriented x 4  VS stable on room air  Denies any pain or discomfort  Patient ambulated to the restroom without difficulties     AM meds complete  Patient tolerated well    Heparin infusing at 1960 units/hr    Patient updated on care  Consent form for  coronary angiography signed    No needs voiced at this time    Call light within reach    Patient off unit for cardiac procedure    Electronically signed by Kristine Pennington RN on 8/5/2024 at 10:02 AM   
4 Eyes Skin Assessment     NAME:  Ivania Jenknis  YOB: 1980  MEDICAL RECORD NUMBER:  [id number]    The patient is being assessed for  Admission    I agree that at least one RN has performed a thorough Head to Toe Skin Assessment on the patient. ALL assessment sites listed below have been assessed.      Areas assessed by both nurses:    Head, Face, Ears, Shoulders, Back, Chest, Arms, Elbows, Hands, Sacrum. Buttock, Coccyx, Ischium, and Legs. Feet and Heels        Does the Patient have a Wound? No noted wound(s)       Abhinav Prevention initiated by RN: No  Wound Care Orders initiated by RN: No    Pressure Injury (Stage 3,4, Unstageable, DTI, NWPT, and Complex wounds) if present, place Wound referral order by RN under : No    New Ostomies, if present place, Ostomy referral order under : No     Nurse 1 eSignature: Electronically signed by Kristine Pennington RN on 8/3/24 at 12:19 PM EDT    **SHARE this note so that the co-signing nurse can place an eSignature**    Nurse 2 eSignature: Electronically signed by Lindsay Sheets RN on 8/3/24 at 6:36 PM EDT   
CLINICAL PHARMACY NOTE: MEDS TO BEDS    Total # of Prescriptions Filled: 1   The following medications were delivered to the patient:  Current Discharge Medication List        START taking these medications    Details   metoprolol succinate (TOPROL XL) 50 MG extended release tablet Take 1 tablet by mouth daily  Qty: 30 tablet, Refills: 3               Additional Documentation: Tubed to Sarah    
Clinical Pharmacy Note  Heparin Dosing       Lab Results   Component Value Date/Time    ANTIXAUHEP 0.54 08/05/2024 05:28 AM      Lab Results   Component Value Date/Time    HGB 13.9 08/05/2024 05:28 AM    HCT 40.9 08/05/2024 05:28 AM     08/05/2024 05:28 AM       Current Infusion Rate: 1960 units/hr    Plan:  Bolus: 0 units  Rate: 1960 units/hr  Next anti-Xa level: 1200 08/05/24    Pharmacy will continue to monitor and adjust based on anti-Xa results.    Layne Díaz, PharmD    
Clinical Pharmacy Note  Heparin Dosing       Lab Results   Component Value Date/Time    ANTIXAUHEP <0.10 08/03/2024 10:27 AM      Lab Results   Component Value Date/Time    HGB 12.8 08/03/2024 10:27 AM    HCT 37.8 08/03/2024 10:27 AM     08/03/2024 10:27 AM       Current Infusion Rate: 1000 units/hr    Plan:  Bolus: 4000 units  Rate: 1480 units/hr  Next anti-Xa level: 1900 8/3/24    Pharmacy will continue to monitor and adjust based on anti-Xa results.   Caterina Spaulding [facility], PharmD, 8/3/2024 12:40 PM    
Clinical Pharmacy Note  Heparin Dosing       Lab Results   Component Value Date/Time    APTT 33.8 08/03/2024 10:27 AM     Lab Results   Component Value Date/Time    HGB 12.8 08/03/2024 10:27 AM    HCT 37.8 08/03/2024 10:27 AM     08/03/2024 10:27 AM       Lab Results   Component Value Date/Time    ANTIXAUHEP 0.26 08/03/2024 07:36 PM       Current Infusion Rate: 1480 units/hr    Plan:  Bolus: 4000 units  Rate: 1720 units/hr  Next anti-Xa level: 0300    Pharmacy will continue to monitor and adjust based on anti-Xa results.  Zach Trejo RPH, 8/3/2024 8:54 PM    
Clinical Pharmacy Note  Heparin Dosing       Lab Results   Component Value Date/Time    APTT 33.8 08/03/2024 10:27 AM     Lab Results   Component Value Date/Time    HGB 13.9 08/04/2024 08:38 AM    HCT 42.2 08/04/2024 08:38 AM     08/04/2024 08:38 AM       Lab Results   Component Value Date/Time    ANTIXAUHEP 0.27 08/04/2024 03:11 PM       Current Infusion Rate: 1720 units/hr    Plan:  2000 unit bolus  Rate: continue at 1960 units/hr  Next anti-Xa level: 0230 8/5/24    Pharmacy will continue to monitor and adjust based on anti-Xa results.  Zach Trejo RPH, , 8/4/2024 6:46 PM    
Clinical Pharmacy Note  Heparin Dosing       Lab Results   Component Value Date/Time    APTT 33.8 08/03/2024 10:27 AM     Lab Results   Component Value Date/Time    HGB 13.9 08/04/2024 08:38 AM    HCT 42.2 08/04/2024 08:38 AM     08/04/2024 08:38 AM       Lab Results   Component Value Date/Time    ANTIXAUHEP 0.54 08/04/2024 08:38 AM       Current Infusion Rate: 1720 units/hr    Plan:  Rate: continue at 1720 units/hr  Next anti-Xa level: 1500 8/4/24    Pharmacy will continue to monitor and adjust based on anti-Xa results.  Caterina Spaulding RPH, PharmD, 8/4/2024 11:03 AM    
Clinical Pharmacy Note  Heparin Dosing Consult    Ivania Jenkins is a 44 y.o. female ordered heparin per CAD/STEMI/NSTEMI/UA/AFIB nomogram by Dr. Baeza.     No results found for: \"APTT\"  Lab Results   Component Value Date/Time    HGB 13.2 08/02/2024 10:55 PM    HCT 38.6 08/02/2024 10:55 PM     08/02/2024 10:55 PM       Ht Readings from Last 1 Encounters:   08/02/24 1.702 m (5' 7\")        Wt Readings from Last 1 Encounters:   08/02/24 119.7 kg (263 lb 14.3 oz)        Assessment/Plan:  Initial bolus: 4000 units  Initial infusion rate: 1000 units/hr  Next anti-Xa:: 1000 08/03/24    Pharmacy will continue to monitor adjust heparin based on anti-Xa results using nomogram below:     CAD/STEMI/NSTEMI/UA/AFIB Heparin Nomogram     Initial Bolus: 60 units/kg Max Bolus: 4,000 units       Initial Rate: 12 units/kg/hr Max Initial Rate: 1,000 units/hr     anti-Xa Bolus Titration   < 0.1 Heparin 60 units/kg bolus Increase drip by 4 units/kg/hr   0.1 - 0.29 Heparin 30 units/kg bolus Increase drip by 2 units/kg/hr   0.3 - 0.7 No Bolus No Change   0.71 - 0.8 No Bolus Decrease drip by 1 units/kg/hr   0.81 - 0.99 No Bolus Decrease drip by 2 units/kg/hr   > 1 Hold Heparin for 1 hour Decrease drip by 3 units/kg/hr     Obtain anti-Xa 6 hours after initial bolus and 6 hours after any dose change until two consecutive therapeutic anti-Xa levels are achieved - then daily.    Layne íDaz, PharmD    
[facility]   Cardiology Hospital Progress Note     Date: 8/4/2024  Admit Date: 8/2/2024     Reason for consultation: NSTEMI     Chief Complaint   Patient presents with    Chest Pain    Shortness of Breath     Chest pain and SOB for two days. Pt states she is not taking her blood pressure medications because she is tired of peeing all the time.        History of Present Illness: History obtained from patient and medical record.     Ivania Jenkins is a 44 y.o. female with a past medical history of  hypertension, hyperlipidemia who presented to the hospital with complaints of epigastric and lower chest pain radiating to her back for last 3 days.  She attributed this pain to her indigestion and took Tums with some relief of her symptoms.  She did complain of mild shortness of breath but had no diaphoresis or radiation of this pain to her jaws or to her arms.  Since chest pain kept coming back, she decided to come to the hospital  In ER her EKG showed no acute changes but her troponin was elevated and she has been admitted for further evaluation and treatment.  She currently denies having any discomfort in the chest or in the back(per consult note)    Interval Hx: Today, she is up walking around at the sink brushing her teeth.  Belching frequently.  States pain is improving States she feels like she has a lot of gas in her chest area.  Discussed the need for cardiac cath.  Patient is agreeable.  Discussed risk factors for coronary artery disease.  Aware she needs to quit smoking.    Patient seen and examined. Clinical notes reviewed. Telemetry reviewed.  No new complaints today. No major events overnight.   Denies having chest pain, palpitations, shortness of breath, orthopnea/PND, cough, or dizziness at the time of this visit.      Past Medical History:  Past Medical History:   Diagnosis Date    Hypertension         Past Surgical History:    has a past surgical history that includes Skin lesion excision 
Patient admitted from [facility] ED to room 4256 due to chest pain and shortness of breath  Patient alert and oriented x 4.  Vitals stable on room air.  Answers questions appropriately.     HENDERSON without difficulty  Gait steady     Lungs clear bilaterally  S 1 S 2 audible  SR on tele     Bowels sounds active  Patient continent of bowel  Incontinent of bladder    No skin impairment noted      Orders reviewed with patient.  Nursing assessment completed and in chart     Call light and belongings placed within reach    Heparin infusing at 1000 units/hr    On bleeding precautions    Electronically signed by Kristine Pennington RN on 8/3/2024 at 12:18 PM   
Patient back to floor from cath lab    Patient alert and oriented x 4  Vitals stable on room air  Denies any chest pain, shortness of breath    Patient updated on care  Call light placed within reach    No edema, hematoma, redness to R. Radial angiogram access site  Dressing d/c/i  Arm board in place    Needs met    Electronically signed by Kristine Pennington RN on 8/5/2024 at 3:48 PM       
TR band removed / site cleaned/ occlusive dressing placed with arm board.  Post instructions reviewed / verb. Understanding  Report called and ready for transfer to 4256 per bed on monitor  
This RN explained to pt that we need to do pre surgery prep, how to clean with CHG wipes. Pt needs to put on a new gown and needs all clean bed linens. Asked pt to sign Informed consent for planned angiography. Pt states not now, she needs sleep. This RN explained to pt these procedures are typically done early. Pt states she will do it and does not need my help. Left informed consent with pt to sign.   
Verbal and written discharge instructions were given to the patient. Patient verbalized understanding of d/c instructions including medication orders for home and possible side effects. Pt instructed and verbalizes understanding to call the doctor listed if they have any complications or worsening of symptoms and verbalizes understanding about follow up appointments. D/cd IV access, patient tolerated well, showed no signs of bleeding. Pt discharged to home with all belongings. Waiting on ride to discharge.   
significant pericardial effusion.  There is an ovoid soft tissue mass in the anterior mediastinum measuring 2.9 x 3.9 cm, and 42 Hounsfield units.  No significant lymphadenopathy is seen in the mediastinum.  No hilar lymphadenopathy is identified. Lungs/Pleura: No lung mass is identified.  No parenchymal consolidation.  No pleural effusion.  No spiculated lung mass. Soft Tissues/Bones: No axillary or supraclavicular lymphadenopathy.  No acute osseous abnormality is seen.  Mild degenerative change seen in the thoracic spine. CTA ABDOMEN: Abdominal aorta/Branches: No abdominal aortic aneurysm.  No dissection.  No acute vascular abnormality is identified.  There is an 8 mm right renal artery aneurysm on axial image 116, series 10.  There is a partially thrombosed partially rim calcified 1.8 cm right renal artery aneurysm as well.  No left-sided renal aneurysm is seen.  No acute mesenteric vascular abnormality is seen. Organs: No solid enhancing mass identified in the liver or spleen.  No adrenal mass.  Unremarkable pancreas.  No solid enhancing renal mass.  No renal calculi or obstruction. GI/Bowel: No ileus or obstruction.  Normal appendix.  Colonic diverticulosis. No acute diverticulitis. Peritoneum/Retroperitoneum: No retroperitoneal or mesenteric lymphadenopathy. No bowel herniation is seen. CTA PELVIS: Aorta/Iliacs: No acute vascular abnormality seen within the iliac branches or proximal thighs. Other: No bladder wall thickening.  No pelvic hemorrhage.  Uterus and adnexal regions appear unremarkable.  No pelvic lymphadenopathy. Bones/Soft Tissues: No acute pelvic fracture is seen.  Degenerative changes in the sacroiliac joints and spine.  No acute osseous abnormality is identified.     1. No aortic aneurysm or dissection. 2. No pulmonary emboli. 3. Ovoid soft tissue mass in the anterior mediastinum measuring 2.9 x 3.9 cm. This finding is nonspecific, though given location and appearance may represent a thymoma,

## 2024-08-06 NOTE — DISCHARGE SUMMARY
calculi or obstruction. GI/Bowel: No ileus or obstruction.  Normal appendix.  Colonic diverticulosis. No acute diverticulitis. Peritoneum/Retroperitoneum: No retroperitoneal or mesenteric lymphadenopathy. No bowel herniation is seen. CTA PELVIS: Aorta/Iliacs: No acute vascular abnormality seen within the iliac branches or proximal thighs. Other: No bladder wall thickening.  No pelvic hemorrhage.  Uterus and adnexal regions appear unremarkable.  No pelvic lymphadenopathy. Bones/Soft Tissues: No acute pelvic fracture is seen.  Degenerative changes in the sacroiliac joints and spine.  No acute osseous abnormality is identified.     1. No aortic aneurysm or dissection. 2. No pulmonary emboli. 3. Ovoid soft tissue mass in the anterior mediastinum measuring 2.9 x 3.9 cm. This finding is nonspecific, though given location and appearance may represent a thymoma, though thymic carcinoma, or lymphoma are additional differential considerations.  Recommend non emergent consultation with cardiothoracic surgery. 4. There is a 1.8 cm partially thrombosed partially rim calcified right renal artery aneurysm.  There is an additional 8 mm right renal artery aneurysm. Based on ACR recommendations, consultation with vascular surgery on a nonemergent basis for possible endovascular/surgical/imaging follow-up recommended. 5. Colonic diverticulosis without acute diverticulitis.       CBC:   Recent Labs     08/03/24  1027 08/04/24  0838 08/05/24  0528   WBC 6.0 6.7 7.2   HGB 12.8 13.9 13.9    283 263     BMP:    Recent Labs     08/04/24  0838 08/05/24  0528    136   K 3.8 4.1    100   CO2 25 22   BUN 11 13   CREATININE 0.8 0.8   GLUCOSE 94 113*     Hepatic:   Recent Labs     08/04/24  0838   AST 12*   ALT 12   BILITOT 0.3   ALKPHOS 72     Lipids:   Lab Results   Component Value Date/Time    CHOL 239 04/23/2024 04:00 PM    HDL 53 04/23/2024 04:00 PM    TRIG 284 04/23/2024 04:00 PM     Hemoglobin A1C:   Lab Results

## 2024-08-06 NOTE — PLAN OF CARE
Problem: Discharge Planning  Goal: Discharge to home or other facility with appropriate resources  8/6/2024 1005 by Sarah Parada RN  Outcome: Completed  8/6/2024 0825 by Sarah Parada RN  Outcome: Progressing  8/6/2024 0034 by Diann Thorpe RN  Outcome: Progressing     Problem: Safety - Adult  Goal: Free from fall injury  8/6/2024 1005 by Sarah Parada RN  Outcome: Completed  8/6/2024 0825 by Sarah Parada RN  Outcome: Progressing  8/6/2024 0034 by Diann Thorpe RN  Outcome: Progressing     Problem: ABCDS Injury Assessment  Goal: Absence of physical injury  8/6/2024 1005 by Sarah Parada RN  Outcome: Completed  8/6/2024 0825 by Sarah Parada RN  Outcome: Progressing  8/6/2024 0034 by Diann Thorpe RN  Outcome: Progressing     Problem: Cardiovascular - Adult  Goal: Maintains optimal cardiac output and hemodynamic stability  8/6/2024 1005 by Sarah Parada RN  Outcome: Completed  8/6/2024 0825 by Sarah Parada RN  Outcome: Progressing  8/6/2024 0034 by Diann Thorpe RN  Outcome: Progressing  Goal: Absence of cardiac dysrhythmias or at baseline  8/6/2024 1005 by Sarah Parada RN  Outcome: Completed  8/6/2024 0825 by Sarah Parada RN  Outcome: Progressing  8/6/2024 0034 by Diann Thorpe RN  Outcome: Progressing

## 2024-08-07 ENCOUNTER — TELEPHONE (OUTPATIENT)
Dept: CARDIOLOGY CLINIC | Age: 44
End: 2024-08-07

## 2024-08-07 NOTE — TELEPHONE ENCOUNTER
ED notes faxed to us. Patient was admitted 8/2-8/6. Patient is needing 1 week heart failure f/up with [facility]. Next available is 8/26.    Please assist on best time/date.

## 2024-08-13 NOTE — TELEPHONE ENCOUNTER
I've been out of the office since 8/6. Just seeing this message today.   Hospital follow ups can be addressed with Alison Silva in the future.   8/26 is fine

## 2024-08-22 NOTE — PROGRESS NOTES
[facility]  [address]  [place] [address]  [phone]    PrimaryCare Doctor:  Kimberly Rodriguez MD  Primary Cardiologist:     Chief Complaint   Patient presents with    Follow-Up from Hospital     States feeling good for most part     History of Present Illness:  Ivania Jenkins is a 44 y.o. female with PMH HTN, HLP    Patient was admitted to [facility] with complaints of epigastric and lower chest pain radiating to her back x 3 days.  Associated with mild shortness of breath. she attributed her pain to indigestion and took Tums with minimal relief of her symptoms.  Denied nausea vomiting diaphoresis or radiating pain     ED workup revealed elevated troponin.  EKG without ischemic changes.   LHC- norm cors but had elevated LVEDP- received lasix.      Today she is laying flat in bed.   Denies SOB or CP with activity.   R wrist site CDI, no numbness/tingling.       Patient presents to [facility] cardiology for follow up for chest pain    Cont to have chest pressure when smoking a cigarette.   Continues to smoke- 1-2 days  Started age 16  Using gums and patches  Previously used chantix which caused her nightmares    Attempted to discuss NSTEMI, HF, high BP and symptoms.   She C/O BLE edema.  Explained that BLE edema likely from hypervolemia R/T her heart function. Attempted to explain what her medications are for. She does say that someone told her she had fluid around her heart in the hospital.   She is unable to understand. She is erratic and does not seem to hold any train of thought.   When advised to resume chlorthalidone she becomes angry and says \"I'm not taking that f**jovany pill.\" But then demands I do something about her swelling.  She C/O about incontinence and not being able to hold her urine. When asked if she has ever seen urology she says \"That's not the f**jovany problem and I'm tired of people telling me that.\"    Review of Systems:   General:  refusing recommendations.   I advised her to MURIEL w PCP but also told her we can see her back if she wants to further discuss her heart and treatment for BP and hypervolemia.     Instructions:     Heart Healthy Diet  Blood pressure control if  you have high blood pressure  Diabetic control if you have diabetes  Smoking cessation if you smoke  Weight loss if BMI >30  Regular exercise when OK per cardiac rehab  Stress Reduction    Call your Doctor if you have:  Increased shortness of breath, weakness, or increased fatigue  A fast or a slow heartbeat  Problems or questions with your medications  Feeling lightheaded or dizzy  Unusual swelling of lower legs/feet, chest discomfort, unusual weakness or fatigue    I appreciate the opportunity of cooperating in the care of this individual.    JOSSIE RODRIGUSE, JAIME - CNP, CNP, 8/26/2024,2:49 PM

## 2024-08-26 ENCOUNTER — OFFICE VISIT (OUTPATIENT)
Dept: CARDIOLOGY CLINIC | Age: 44
End: 2024-08-26

## 2024-08-26 VITALS
SYSTOLIC BLOOD PRESSURE: 110 MMHG | WEIGHT: 273 LBS | BODY MASS INDEX: 40.43 KG/M2 | OXYGEN SATURATION: 98 % | HEIGHT: 69 IN | DIASTOLIC BLOOD PRESSURE: 66 MMHG | HEART RATE: 78 BPM

## 2024-08-26 DIAGNOSIS — R94.30 ELEVATED LEFT VENTRICULAR END-DIASTOLIC PRESSURE (LVEDP): ICD-10-CM

## 2024-08-26 DIAGNOSIS — I21.4 NON-STEMI (NON-ST ELEVATED MYOCARDIAL INFARCTION) (HCC): Primary | ICD-10-CM

## 2024-08-26 DIAGNOSIS — Z72.0 TOBACCO ABUSE: ICD-10-CM

## 2024-08-26 DIAGNOSIS — Z71.6 TOBACCO ABUSE COUNSELING: ICD-10-CM

## 2024-10-10 ENCOUNTER — HOSPITAL ENCOUNTER (EMERGENCY)
Age: 44
Discharge: HOME | End: 2024-10-10
Attending: EMERGENCY MEDICINE
Payer: COMMERCIAL

## 2024-10-10 VITALS
BODY MASS INDEX: 43.04 KG/M2 | OXYGEN SATURATION: 99 % | WEIGHT: 274.25 LBS | SYSTOLIC BLOOD PRESSURE: 117 MMHG | HEIGHT: 67 IN | TEMPERATURE: 98.1 F | RESPIRATION RATE: 18 BRPM | HEART RATE: 82 BPM | DIASTOLIC BLOOD PRESSURE: 87 MMHG

## 2024-10-10 DIAGNOSIS — S61.211A LACERATION OF LEFT INDEX FINGER WITHOUT FOREIGN BODY WITHOUT DAMAGE TO NAIL, INITIAL ENCOUNTER: Primary | ICD-10-CM

## 2024-10-10 PROCEDURE — 99283 EMERGENCY DEPT VISIT LOW MDM: CPT

## 2024-10-10 PROCEDURE — 12002 RPR S/N/AX/GEN/TRNK2.6-7.5CM: CPT

## 2024-10-10 RX ORDER — CEPHALEXIN 500 MG/1
500 CAPSULE ORAL 4 TIMES DAILY
Qty: 40 CAPSULE | Refills: 0 | Status: SHIPPED | OUTPATIENT
Start: 2024-10-10 | End: 2024-10-20

## 2024-10-10 ASSESSMENT — PAIN SCALES - GENERAL: PAINLEVEL_OUTOF10: 0

## 2024-10-11 NOTE — ED PROVIDER NOTES
EMERGENCY DEPARTMENT ENCOUNTER     [facility]     Pt Name: Ivania Jenkins   MRN: [id number]   Birthdate 1980   Date of evaluation: 10/10/2024   Provider: Roman Mcmanus MD   PCP: Kimberly Rodriguez MD   Note Started: 1:34 AM EDT 10/11/24     CHIEF COMPLAINT     Chief Complaint   Patient presents with    Laceration     Pt presents to the ED d/t left pointer finger laceration. Unknown last tetanus shot         HISTORY OF PRESENT ILLNESS:  History from : Patient   Limitations to history : None     Ivania Jenkins is a 44 y.o. female who presents for laceration.  Patient reports around 9 or 10 AM she excellently cut her left index finger on steel wall while at home.  She went about her day and went to some important up appointments and is here now for repair.  Last tetanus shot 5 years ago.  Bleeding controlled prior to arrival. Patient has a healing laceration of the mid forehead that she reports was sutured at [facility] over the weekend.    Nursing Notes were all reviewed and agreed with or any disagreements were addressed in the HPI.     ROS: Positives and Pertinent negatives as per HPI.    PAST MEDICAL HISTORY     Past medical history:  has a past medical history of Hypertension.    Past surgical history:  has a past surgical history that includes Skin lesion excision (N/A, 4/11/2023) and Cardiac procedure (Right, 8/5/2024).    Med list:   No current facility-administered medications on file prior to encounter.     Current Outpatient Medications on File Prior to Encounter   Medication Sig Dispense Refill    metoprolol succinate (TOPROL XL) 50 MG extended release tablet Take 1 tablet by mouth daily 30 tablet 3    varenicline (CHANTIX) 1 MG tablet Take 1 tablet by mouth 2 times daily (Patient not taking: Reported on 8/4/2024) 60 tablet 5    Semaglutide-Weight Management (WEGOVY) 0.5 MG/0.5ML SOAJ SC injection Inject 0.5 mg into the skin every 7 days Call for

## 2024-10-11 NOTE — ED NOTES
Discharge and education instructions reviewed. Patient verbalized understanding, teach-back successful. Patient denied questions at this time. No acute distress noted. Patient instructed to follow-up as noted - return to emergency department if symptoms worsen. Patient verbalized understanding. Discharged per EDMD with discharged instructions.    Pt leaving by her private vehicle.

## 2024-10-18 ENCOUNTER — HOSPITAL ENCOUNTER (EMERGENCY)
Age: 44
Discharge: HOME | End: 2024-10-18
Attending: EMERGENCY MEDICINE
Payer: COMMERCIAL

## 2024-10-18 VITALS
HEART RATE: 69 BPM | OXYGEN SATURATION: 97 % | BODY MASS INDEX: 43.04 KG/M2 | DIASTOLIC BLOOD PRESSURE: 64 MMHG | RESPIRATION RATE: 20 BRPM | HEIGHT: 67 IN | SYSTOLIC BLOOD PRESSURE: 137 MMHG | WEIGHT: 274.25 LBS

## 2024-10-18 DIAGNOSIS — Z48.02 VISIT FOR SUTURE REMOVAL: Primary | ICD-10-CM

## 2024-10-18 PROCEDURE — 99282 EMERGENCY DEPT VISIT SF MDM: CPT

## 2024-10-18 ASSESSMENT — PAIN - FUNCTIONAL ASSESSMENT: PAIN_FUNCTIONAL_ASSESSMENT: NONE - DENIES PAIN

## 2024-10-18 NOTE — DISCHARGE INSTRUCTIONS
Once per day for the next 4 days:  Remove your bandage(s)  Clean your wound thoroughly with soap and water  Gently dry the area  Apply topical antibiotic ointment  Cover your wound with a new dressing    Be sure to contact your primary care provider to arrange for a follow up visit.    Continue to moisturize your forehead frequently.    If you have any new or worsening issues after going home don't hesitate to return here for reevaluation at any time 24/7!

## 2024-10-18 NOTE — ED TRIAGE NOTES
Pt arrives ambulatory for stitch removal. Pt is a/xo4, rsp nonlabored and skin race appropriate, warm and try.

## 2024-10-28 ASSESSMENT — ENCOUNTER SYMPTOMS: COLOR CHANGE: 0

## 2024-11-15 ENCOUNTER — OFFICE VISIT (OUTPATIENT)
Dept: PRIMARY CARE CLINIC | Age: 44
End: 2024-11-15

## 2024-11-15 VITALS
WEIGHT: 270 LBS | DIASTOLIC BLOOD PRESSURE: 84 MMHG | HEIGHT: 67 IN | SYSTOLIC BLOOD PRESSURE: 132 MMHG | BODY MASS INDEX: 42.38 KG/M2

## 2024-11-15 DIAGNOSIS — Z12.11 SCREEN FOR COLON CANCER: ICD-10-CM

## 2024-11-15 DIAGNOSIS — Z00.00 ENCOUNTER FOR MEDICAL EXAMINATION TO ESTABLISH CARE: Primary | ICD-10-CM

## 2024-11-15 DIAGNOSIS — I21.4 NSTEMI (NON-ST ELEVATED MYOCARDIAL INFARCTION) (HCC): ICD-10-CM

## 2024-11-15 DIAGNOSIS — I10 ESSENTIAL HYPERTENSION: ICD-10-CM

## 2024-11-15 DIAGNOSIS — Z76.89 REFERRAL OF PATIENT WITHOUT EXAMINATION OR TREATMENT: ICD-10-CM

## 2024-11-15 PROBLEM — K21.9 GASTROESOPHAGEAL REFLUX DISEASE WITHOUT ESOPHAGITIS: Status: RESOLVED | Noted: 2019-12-03 | Resolved: 2024-11-15

## 2024-11-15 RX ORDER — METOPROLOL SUCCINATE 50 MG/1
50 TABLET, EXTENDED RELEASE ORAL DAILY
Qty: 90 TABLET | Refills: 1 | Status: SHIPPED | OUTPATIENT
Start: 2024-11-15

## 2024-11-15 RX ORDER — CHLORTHALIDONE 25 MG/1
TABLET ORAL
Qty: 90 TABLET | Refills: 1 | Status: SHIPPED | OUTPATIENT
Start: 2024-11-15

## 2024-11-15 RX ORDER — NIFEDIPINE 30 MG/1
30 TABLET, EXTENDED RELEASE ORAL DAILY
Qty: 90 TABLET | Refills: 1 | Status: SHIPPED | OUTPATIENT
Start: 2024-11-15

## 2024-11-15 ASSESSMENT — ENCOUNTER SYMPTOMS
SINUS PAIN: 0
CHEST TIGHTNESS: 0
NAUSEA: 0
CONSTIPATION: 0
ABDOMINAL PAIN: 0
WHEEZING: 0
BACK PAIN: 0
EYE PAIN: 0
FACIAL SWELLING: 0
TROUBLE SWALLOWING: 0
SORE THROAT: 0
SHORTNESS OF BREATH: 0
VOMITING: 0
BLOOD IN STOOL: 0
COUGH: 0
DIARRHEA: 0

## 2024-11-15 ASSESSMENT — PATIENT HEALTH QUESTIONNAIRE - PHQ9
SUM OF ALL RESPONSES TO PHQ QUESTIONS 1-9: 0
2. FEELING DOWN, DEPRESSED OR HOPELESS: NOT AT ALL
1. LITTLE INTEREST OR PLEASURE IN DOING THINGS: NOT AT ALL
SUM OF ALL RESPONSES TO PHQ QUESTIONS 1-9: 0
SUM OF ALL RESPONSES TO PHQ9 QUESTIONS 1 & 2: 0
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
## 2024-12-31 LAB — NONINV COLON CA DNA+OCC BLD SCRN STL QL: NEGATIVE

## 2025-01-22 ENCOUNTER — OFFICE VISIT (OUTPATIENT)
Dept: PRIMARY CARE CLINIC | Age: 45
End: 2025-01-22

## 2025-01-22 VITALS
SYSTOLIC BLOOD PRESSURE: 132 MMHG | WEIGHT: 269 LBS | DIASTOLIC BLOOD PRESSURE: 84 MMHG | BODY MASS INDEX: 42.22 KG/M2 | HEIGHT: 67 IN

## 2025-01-22 DIAGNOSIS — R29.818 SUSPECTED SLEEP APNEA: ICD-10-CM

## 2025-01-22 DIAGNOSIS — I10 ESSENTIAL HYPERTENSION: ICD-10-CM

## 2025-01-22 DIAGNOSIS — E66.813 CLASS 3 SEVERE OBESITY DUE TO EXCESS CALORIES WITH SERIOUS COMORBIDITY AND BODY MASS INDEX (BMI) OF 45.0 TO 49.9 IN ADULT: Primary | ICD-10-CM

## 2025-01-22 DIAGNOSIS — E55.9 VITAMIN D DEFICIENCY: ICD-10-CM

## 2025-01-22 DIAGNOSIS — E66.01 CLASS 3 SEVERE OBESITY DUE TO EXCESS CALORIES WITH SERIOUS COMORBIDITY AND BODY MASS INDEX (BMI) OF 45.0 TO 49.9 IN ADULT: Primary | ICD-10-CM

## 2025-01-22 DIAGNOSIS — I21.4 NSTEMI (NON-ST ELEVATED MYOCARDIAL INFARCTION) (HCC): ICD-10-CM

## 2025-01-22 RX ORDER — CHOLECALCIFEROL (VITAMIN D3) 25 MCG
1000 TABLET ORAL DAILY
Qty: 90 TABLET | Refills: 1 | Status: SHIPPED | OUTPATIENT
Start: 2025-01-22

## 2025-01-22 ASSESSMENT — ENCOUNTER SYMPTOMS
BACK PAIN: 0
CHEST TIGHTNESS: 0
TROUBLE SWALLOWING: 0
SORE THROAT: 0
CONSTIPATION: 0
VOMITING: 0
NAUSEA: 0
ABDOMINAL PAIN: 0
COUGH: 0
FACIAL SWELLING: 0
BLOOD IN STOOL: 0
SHORTNESS OF BREATH: 0
EYE PAIN: 0
SINUS PAIN: 0
DIARRHEA: 0
WHEEZING: 0

## 2025-01-22 NOTE — PROGRESS NOTES
2025    Ivania Jenkins (:  1980) dave 44 y.o. female, here for evaluation of the following medical concerns:    HPI    Weight loss   Ivania Jenkins is a 44 y.o. female here for discussion regarding weight loss. She has noted a weight gain of approximately 16 pounds over the last 1 year. She feels ideal weight is closer to 200 pounds.. History of eating disorders: none. Previous treatments for obesity include nutritionist consultation, self-directed dieting, and supervised diet program. Obesity associated medical conditions: congestive heart failure and hypertension. Obesity associated medications: none. Cardiovascular risk factors besides obesity: hypertension, obesity (BMI >= 30 kg/m2), and sedentary lifestyle.    Hypertension/NSTEMI  Patient is here for follow-up of elevated blood pressure. She is not exercising and is somewhat adherent to a low-salt diet. Blood pressure is somewhat well controlled at home. Cardiac symptoms: none. Patient denies chest pain, chest pressure/discomfort, claudication, dyspnea, exertional chest pressure/discomfort, fatigue, irregular heart beat, lower extremity edema, near-syncope, orthopnea, palpitations, paroxysmal nocturnal dyspnea, syncope, and tachypnea. Cardiovascular risk factors: hypertension, obesity (BMI >= 30 kg/m2), sedentary lifestyle, and former smoker.  Use of agents associated with hypertension: none. History of target organ damage: angina/ prior myocardial infarction.    Patient requesting refill of vitamin D supplement    Review of Systems   Constitutional:  Positive for fatigue and unexpected weight change. Negative for chills and fever.   HENT:  Negative for congestion, ear pain, facial swelling, sinus pain, sore throat and trouble swallowing.    Eyes:  Negative for pain and visual disturbance.   Respiratory:  Negative for cough, chest tightness, shortness of breath and wheezing.    Cardiovascular:  Negative for chest pain, palpitations and leg

## 2025-03-03 ENCOUNTER — TELEMEDICINE (OUTPATIENT)
Dept: PRIMARY CARE CLINIC | Age: 45
End: 2025-03-03
Payer: COMMERCIAL

## 2025-03-03 DIAGNOSIS — R94.30 ELEVATED LEFT VENTRICULAR END-DIASTOLIC PRESSURE (LVEDP): ICD-10-CM

## 2025-03-03 DIAGNOSIS — E66.813 CLASS 3 SEVERE OBESITY DUE TO EXCESS CALORIES WITH SERIOUS COMORBIDITY AND BODY MASS INDEX (BMI) OF 40.0 TO 44.9 IN ADULT: Primary | ICD-10-CM

## 2025-03-03 DIAGNOSIS — E66.01 CLASS 3 SEVERE OBESITY DUE TO EXCESS CALORIES WITH SERIOUS COMORBIDITY AND BODY MASS INDEX (BMI) OF 40.0 TO 44.9 IN ADULT: Primary | ICD-10-CM

## 2025-03-03 DIAGNOSIS — I21.4 NSTEMI (NON-ST ELEVATED MYOCARDIAL INFARCTION) (HCC): ICD-10-CM

## 2025-03-03 DIAGNOSIS — R29.818 SUSPECTED SLEEP APNEA: ICD-10-CM

## 2025-03-03 PROCEDURE — 99214 OFFICE O/P EST MOD 30 MIN: CPT | Performed by: NURSE PRACTITIONER

## 2025-03-03 PROCEDURE — G8428 CUR MEDS NOT DOCUMENT: HCPCS | Performed by: NURSE PRACTITIONER

## 2025-03-03 ASSESSMENT — ENCOUNTER SYMPTOMS
WHEEZING: 0
TROUBLE SWALLOWING: 0
ABDOMINAL PAIN: 0
BACK PAIN: 0
CONSTIPATION: 0
SHORTNESS OF BREATH: 0
VOMITING: 0
FACIAL SWELLING: 0
DIARRHEA: 0
NAUSEA: 0
BLOOD IN STOOL: 0
COUGH: 0
SINUS PAIN: 0
SORE THROAT: 0
EYE PAIN: 0
CHEST TIGHTNESS: 0

## 2025-03-03 NOTE — PROGRESS NOTES
3/3/2025    TELEHEALTH EVALUATION -- Audio/Visual    HPI:    Ivania Jenkins (:  1980) has requested an audio/video evaluation for the following concern(s):    Weight loss   Ivania Jenkins is a 44 y.o. female here for discussion regarding weight loss. She has noted a weight gain of approximately 16 pounds over the last 1 year. She feels ideal weight is closer to 200 pounds.  Patient has history of NSTEMI on 2024.  She reports that she was able to obtain Adipex from [facility] over the weekend. Patient obscured medical history/ NSTEMI hx intake documentation . Patient is asking if this is a \"good\" medication for her to take. Patient had not consulted cardiology prior to visit to weight loss center. history of eating disorders: binge eating. Previous treatments for obesity include nutritionist consultation, self-directed dieting, and supervised diet program. Obesity associated medical conditions: congestive heart failure and hypertension. Obesity associated medications: none. Cardiovascular risk factors besides obesity: hypertension, NSTEMI,obesity (BMI >= 30 kg/m2), elevated LV EDP consistent with diastolic dysfunction ,and sedentary lifestyle.Patient states that she has not had sleep study, but generally has sleep issues/insomnia due to mood disorder. She is not taking medication, seeing psychiatry or therapist for mood issues. She denies SI/HI or self harm. Patient continues to smoke 1/2 ppd. She declines cessation intervention.      Review of Systems   Constitutional:  Positive for appetite change and unexpected weight change. Negative for activity change, chills and fever.   HENT:  Negative for congestion, ear pain, facial swelling, sinus pain, sore throat and trouble swallowing.    Eyes:  Negative for pain and visual disturbance.   Respiratory:  Negative for cough, chest tightness, shortness of breath and wheezing.    Cardiovascular:  Negative for chest pain, palpitations and leg swelling.

## 2025-03-05 ENCOUNTER — OFFICE VISIT (OUTPATIENT)
Dept: SLEEP MEDICINE | Age: 45
End: 2025-03-05
Payer: COMMERCIAL

## 2025-03-05 VITALS
OXYGEN SATURATION: 94 % | DIASTOLIC BLOOD PRESSURE: 74 MMHG | HEART RATE: 77 BPM | HEIGHT: 67 IN | WEIGHT: 268 LBS | RESPIRATION RATE: 18 BRPM | TEMPERATURE: 96.4 F | BODY MASS INDEX: 42.06 KG/M2 | SYSTOLIC BLOOD PRESSURE: 124 MMHG

## 2025-03-05 DIAGNOSIS — Z86.79 H/O ISCHEMIC HEART DISEASE: ICD-10-CM

## 2025-03-05 DIAGNOSIS — I10 PRIMARY HYPERTENSION: ICD-10-CM

## 2025-03-05 DIAGNOSIS — E66.01 CLASS 3 SEVERE OBESITY DUE TO EXCESS CALORIES WITH SERIOUS COMORBIDITY AND BODY MASS INDEX (BMI) OF 40.0 TO 44.9 IN ADULT: ICD-10-CM

## 2025-03-05 DIAGNOSIS — E66.813 CLASS 3 SEVERE OBESITY DUE TO EXCESS CALORIES WITH SERIOUS COMORBIDITY AND BODY MASS INDEX (BMI) OF 40.0 TO 44.9 IN ADULT: ICD-10-CM

## 2025-03-05 DIAGNOSIS — R06.83 SNORING: ICD-10-CM

## 2025-03-05 DIAGNOSIS — G47.19 EXCESSIVE DAYTIME SLEEPINESS: ICD-10-CM

## 2025-03-05 DIAGNOSIS — G47.33 OSA (OBSTRUCTIVE SLEEP APNEA): Primary | ICD-10-CM

## 2025-03-05 PROCEDURE — G8427 DOCREV CUR MEDS BY ELIG CLIN: HCPCS | Performed by: PSYCHIATRY & NEUROLOGY

## 2025-03-05 PROCEDURE — 3074F SYST BP LT 130 MM HG: CPT | Performed by: PSYCHIATRY & NEUROLOGY

## 2025-03-05 PROCEDURE — 3078F DIAST BP <80 MM HG: CPT | Performed by: PSYCHIATRY & NEUROLOGY

## 2025-03-05 PROCEDURE — G8417 CALC BMI ABV UP PARAM F/U: HCPCS | Performed by: PSYCHIATRY & NEUROLOGY

## 2025-03-05 PROCEDURE — 4004F PT TOBACCO SCREEN RCVD TLK: CPT | Performed by: PSYCHIATRY & NEUROLOGY

## 2025-03-05 PROCEDURE — 99204 OFFICE O/P NEW MOD 45 MIN: CPT | Performed by: PSYCHIATRY & NEUROLOGY

## 2025-03-05 ASSESSMENT — SLEEP AND FATIGUE QUESTIONNAIRES
HOW LIKELY ARE YOU TO NOD OFF OR FALL ASLEEP WHILE SITTING AND READING: WOULD NEVER DOZE
HOW LIKELY ARE YOU TO NOD OFF OR FALL ASLEEP WHILE WATCHING TV: SLIGHT CHANCE OF DOZING
HOW LIKELY ARE YOU TO NOD OFF OR FALL ASLEEP WHILE SITTING AND TALKING TO SOMEONE: WOULD NEVER DOZE
HOW LIKELY ARE YOU TO NOD OFF OR FALL ASLEEP WHILE SITTING INACTIVE IN A PUBLIC PLACE: WOULD NEVER DOZE
HOW LIKELY ARE YOU TO NOD OFF OR FALL ASLEEP WHILE LYING DOWN TO REST IN THE AFTERNOON WHEN CIRCUMSTANCES PERMIT: MODERATE CHANCE OF DOZING
HOW LIKELY ARE YOU TO NOD OFF OR FALL ASLEEP WHEN YOU ARE A PASSENGER IN A CAR FOR AN HOUR WITHOUT A BREAK: SLIGHT CHANCE OF DOZING
HOW LIKELY ARE YOU TO NOD OFF OR FALL ASLEEP IN A CAR, WHILE STOPPED FOR A FEW MINUTES IN TRAFFIC: WOULD NEVER DOZE
ESS TOTAL SCORE: 4
HOW LIKELY ARE YOU TO NOD OFF OR FALL ASLEEP WHILE SITTING QUIETLY AFTER LUNCH WITHOUT ALCOHOL: WOULD NEVER DOZE

## 2025-03-05 ASSESSMENT — ENCOUNTER SYMPTOMS
SORE THROAT: 1
RESPIRATORY NEGATIVE: 1
ALLERGIC/IMMUNOLOGIC NEGATIVE: 1
EYES NEGATIVE: 1

## 2025-03-05 NOTE — PATIENT INSTRUCTIONS
Orders Placed This Encounter   Procedures    Baseline Diagnostic Sleep Study     Standing Status:   Future     Standing Expiration Date:   3/5/2026     Order Specific Question:   Adult or Pediatric     Answer:   Adult Study (>7 Years)     Order Specific Question:   Location For Sleep Study     Answer:   [place]     Order Specific Question:   Select Sleep Lab Location     Answer:   [facility]    Sleep Study with PAP Titration     Standing Status:   Future     Standing Expiration Date:   3/5/2026     Order Specific Question:   Sleep Study Titration Type     Answer:   CPAP     Order Specific Question:   Location For Sleep Study     Answer:   [place]     Order Specific Question:   Select Sleep Lab Location     Answer:   [facility]

## 2025-03-05 NOTE — PROGRESS NOTES
MD MATEO Inman Board Certified in Sleep Medicine  [facility]  Board Certified in Neurology [facility]  [address]   [address]  [place]  [address]  P- [phone]   [facility]   [address]  [address]   [place], [place] [address]           [facility]  [address] [id number] [phone]    Subjective:     Patient ID: Ivania Jenkins is a 44 y.o. female.    Chief Complaint   Patient presents with    New Patient    Snoring    Daytime Sleepiness    Fatigue       HPI:        Ivania Jenkins is a 44 y.o. female referred by Lobito SANDOVAL for a sleep evaluation. She complains of snoring, snorting, tossing and turning, decreased concentration, excessive daytime sleepiness, feels sleepy during the day, take naps during the day but she denies periods of not breathing, knees buckling with laughing, completely or partially paralyzed while falling asleep or waking up.  Symptoms began several years ago, gradually worsening since that time.   The patient's family confirmed the snoring without the stopped breathing at night  SLEEP SCHEDULE: Goes to bed around 10-11 PM in the weekdays and 11 PM in the weekends. It usually takes the patient few minutes to fall asleep. The patient gets up 3 per night to go to the bathroom. The Patient finally gets up at 9 AM during the weekdays and 9 AM in the weekends. patient wakes up with dry mouth and sometimes morning headache.. the headache usually dull headache.  The patient has restless sleep with frequent arousals in addition to the Patient has significant daytime sleepiness. The Patient scored Martin Sleepiness Score: 4 on Martin Sleepiness Scale ( more than 10 is indicative of daytime sleepiness)and 63 in fatigue scale ( more than 36 is indicative of daytime fatigue). The patient takes 2 naps/week

## 2025-03-07 ENCOUNTER — HOSPITAL ENCOUNTER (OUTPATIENT)
Dept: SLEEP CENTER | Age: 45
Discharge: HOME | End: 2025-03-07
Attending: PSYCHIATRY & NEUROLOGY

## 2025-03-07 DIAGNOSIS — G47.33 OSA (OBSTRUCTIVE SLEEP APNEA): ICD-10-CM

## 2025-03-10 PROBLEM — G47.33 OSA (OBSTRUCTIVE SLEEP APNEA): Status: ACTIVE | Noted: 2025-03-10

## 2025-03-11 ENCOUNTER — TELEPHONE (OUTPATIENT)
Dept: BARIATRICS/WEIGHT MGMT | Age: 45
End: 2025-03-11

## 2025-03-11 ENCOUNTER — TELEPHONE (OUTPATIENT)
Dept: PULMONOLOGY | Age: 45
End: 2025-03-11

## 2025-03-11 NOTE — TELEPHONE ENCOUNTER
PSG Sleep study showed mild KYLIE (on a scale of mild, moderate and severe).  AHI was 12.4  per hr. (Average times per hr breathing was obstructed).  O2 Desaturations to 78% (lowest o2)    Recommends:    Follow up with the patient's sleep physician to discuss results      Avoid sedatives, alcohol and caffeinated drinks at bedtime.    Recommend:  Titration     Sleep Lab used: [facility]    Avoid driving while sleepy.       The patient has been notified of this information and all questions answered.    Transferred to sleep lab

## 2025-03-12 ENCOUNTER — OFFICE VISIT (OUTPATIENT)
Dept: BARIATRICS/WEIGHT MGMT | Age: 45
End: 2025-03-12

## 2025-03-12 VITALS
WEIGHT: 268 LBS | OXYGEN SATURATION: 98 % | SYSTOLIC BLOOD PRESSURE: 137 MMHG | DIASTOLIC BLOOD PRESSURE: 82 MMHG | BODY MASS INDEX: 42.06 KG/M2 | HEART RATE: 78 BPM | HEIGHT: 67 IN | RESPIRATION RATE: 18 BRPM

## 2025-03-12 DIAGNOSIS — E66.01 MORBID OBESITY WITH BMI OF 40.0-44.9, ADULT: Primary | ICD-10-CM

## 2025-03-12 NOTE — PROGRESS NOTES
Ivania Jenkins is a 44 y.o. female with a date of birth of 1980.    Vitals:    03/12/25 1249   BP: 137/82   Pulse: 78   Resp: 18   SpO2: 98%   Weight: 121.6 kg (268 lb)   Height: 1.702 m (5' 7\")    BMI: Body mass index is 41.97 kg/m². Obesity Classification: Class III    Weight History:   Wt Readings from Last 3 Encounters:   03/12/25 121.6 kg (268 lb)   03/05/25 121.6 kg (268 lb)   01/22/25 122 kg (269 lb)       Pt attended Medical Weight Management Seminar. Patient was educated on low-carb diet protocol. Nutrition and habit guidelines were discussed and written information was provided. Bariatric Nutrition Questionnaire completed during class and scanned into media.       Goals  Weight: 180#  Health Improvement: lower blood pressure, help improve sleep apnea, reduce knee pain    Assessment  Nutritional Needs: RMR=(9.99 x 121.6) + (6.25 x 170.2) - (4.92 x 44 y.o.) -161  = 1897 kcal x 1.3 (sedentary activity factor)= 2466 kcal - 1000 (for 2 lb weight loss/week)= 1466 kcal.    Patient has participated in the following weight loss programs: calorie restriction, fasting and keto.   Patient has participated in meal replacement/liquid diets. - juicing  Patient has participated in weight loss medications. - adipex  Patient does not have history of bariatric surgery.     Pt does not eat pork. Pt reports working out. Pt reports being a fast eater, eating until overly full/stuffed, eating out of stress/emotions/boredom and grazing on foods.     Plan  Plan/Recommendations: Start 1500 calorie LC MP  Optifast:  Pt not interested in  Diet Medications:  Pt interested in  Bariatric Surgery:  Pt not interested in  1:1 RD Visit:  Pt not interested in    PES Statement: Overweight/Obesity related to lack of exercise, sedentary lifestyle, unhealthy eating habits, and unsuccessful diet attempts as evidenced by BMI. Body mass index is 41.97 kg/m².    Handouts: protein bars, protein shakes, frozen meals, cravings, tacking sheet and

## 2025-03-17 ENCOUNTER — HOSPITAL ENCOUNTER (OUTPATIENT)
Dept: SLEEP CENTER | Age: 45
Discharge: HOME | End: 2025-03-17
Payer: COMMERCIAL

## 2025-03-17 PROCEDURE — 95811 POLYSOM 6/>YRS CPAP 4/> PARM: CPT

## 2025-03-19 ENCOUNTER — TELEMEDICINE (OUTPATIENT)
Dept: BARIATRICS/WEIGHT MGMT | Age: 45
End: 2025-03-19
Payer: COMMERCIAL

## 2025-03-19 DIAGNOSIS — E66.01 MORBID OBESITY WITH BMI OF 40.0-44.9, ADULT: Primary | ICD-10-CM

## 2025-03-19 DIAGNOSIS — Z71.3 DIETARY COUNSELING AND SURVEILLANCE: ICD-10-CM

## 2025-03-19 PROCEDURE — 99204 OFFICE O/P NEW MOD 45 MIN: CPT | Performed by: FAMILY MEDICINE

## 2025-03-19 PROCEDURE — G8427 DOCREV CUR MEDS BY ELIG CLIN: HCPCS | Performed by: FAMILY MEDICINE

## 2025-03-19 PROCEDURE — G2211 COMPLEX E/M VISIT ADD ON: HCPCS | Performed by: FAMILY MEDICINE

## 2025-03-19 ASSESSMENT — ENCOUNTER SYMPTOMS
VOMITING: 0
CONSTIPATION: 0
APNEA: 0
CHOKING: 0
ABDOMINAL DISTENTION: 0
NAUSEA: 0
EYE PAIN: 0
COUGH: 0
SHORTNESS OF BREATH: 0
DIARRHEA: 0
BLOOD IN STOOL: 0
PHOTOPHOBIA: 0
WHEEZING: 0
ABDOMINAL PAIN: 0
CHEST TIGHTNESS: 0

## 2025-03-19 NOTE — PROGRESS NOTES
Patient: Ivania Jenkins     Encounter Date: 3/19/2025    YOB: 1980               Age: 44 y.o.        Patient identification was verified at the start of the visit.         3/19/2025     2:25 PM   Patient-Reported Vitals   Patient-Reported Weight 260   Patient-Reported Height 5'7   Patient-Reported Systolic 133 mmHg   Patient-Reported Diastolic 88 mmHg   Patient-Reported Pulse 78   Patient-Reported Temperature 97         BP Readings from Last 1 Encounters:   03/12/25 137/82       BMI Readings from Last 1 Encounters:   03/12/25 41.97 kg/m²       Pulse Readings from Last 1 Encounters:   03/12/25 78                                             Wt Readings from Last 3 Encounters:   03/12/25 121.6 kg (268 lb)   03/05/25 121.6 kg (268 lb)   01/22/25 122 kg (269 lb)        Chief Complaint   Patient presents with    Bariatric, Initial Visit     MWM- NP        HPI:    44 y.o. female presents to establish care via video visit. She was referred by Darlene Robin NP for weight management. The patient's medical history is significant for class III obesity. The patient has a long-standing history of obesity which started gradually. The problem is severe.  The patient has been gaining weight.  Risk factors include annual weight gain of >2 lbs (1 kg)/ year and sedentary lifestyle. Aggravating factors include poor diet and lack of physical activity. The patient has tried various diet/exercise plans which have been ineffective in the long-run. she is motivated to start losing weight to help improve her overall health. Interested in aom to help control appetite--specifically requesting Zepbound.     When did you become overweight?  [] Childhood   [] Teens   [x] Adulthood   [] Pregnancy   [] Menopause    Highest adult weight: Current     Triggers for weight gain?   [] Stress   [] Illness   [] Medications   [] Travel  []Injury     [] Nightshift work   [] Insomnia  [x] No specific triggers   [] Other    Food triggers:

## 2025-03-20 ENCOUNTER — TELEPHONE (OUTPATIENT)
Dept: PULMONOLOGY | Age: 45
End: 2025-03-20

## 2025-03-20 NOTE — TELEPHONE ENCOUNTER
Titration study shows adequate control of the events at a CPAP pressure of 8 cm.      Need DME: [facility]    Patient will need appointment 31-90 days after starting new machine    The patient has been notified of this information and all questions answered.

## 2025-04-01 DIAGNOSIS — I10 ESSENTIAL HYPERTENSION: ICD-10-CM

## 2025-04-01 DIAGNOSIS — I21.4 NSTEMI (NON-ST ELEVATED MYOCARDIAL INFARCTION) (HCC): ICD-10-CM

## 2025-04-01 RX ORDER — CHLORTHALIDONE 25 MG/1
25 TABLET ORAL DAILY
Qty: 90 TABLET | Refills: 1 | OUTPATIENT
Start: 2025-04-01

## 2025-04-02 ENCOUNTER — TELEPHONE (OUTPATIENT)
Dept: PRIMARY CARE CLINIC | Age: 45
End: 2025-04-02

## 2025-04-02 DIAGNOSIS — I21.4 NSTEMI (NON-ST ELEVATED MYOCARDIAL INFARCTION) (HCC): ICD-10-CM

## 2025-04-02 DIAGNOSIS — I10 ESSENTIAL HYPERTENSION: ICD-10-CM

## 2025-04-02 RX ORDER — CHLORTHALIDONE 25 MG/1
TABLET ORAL
Qty: 90 TABLET | Refills: 1 | Status: SHIPPED | OUTPATIENT
Start: 2025-04-02

## 2025-04-02 NOTE — TELEPHONE ENCOUNTER
Last office visit 3/3/2025     Last written      Next office visit scheduled Visit date not found    Requested Prescriptions     Pending Prescriptions Disp Refills    chlorthalidone (HYGROTON) 25 MG tablet 90 tablet 1     Sig: TAKE ONE TABLET BY MOUTH DAILY

## 2025-04-03 DIAGNOSIS — I10 ESSENTIAL HYPERTENSION: ICD-10-CM

## 2025-04-03 DIAGNOSIS — I21.4 NSTEMI (NON-ST ELEVATED MYOCARDIAL INFARCTION) (HCC): ICD-10-CM

## 2025-04-03 RX ORDER — CHLORTHALIDONE 25 MG/1
25 TABLET ORAL DAILY
Qty: 90 TABLET | Refills: 1 | OUTPATIENT
Start: 2025-04-03

## 2025-04-08 DIAGNOSIS — E66.01 MORBID OBESITY WITH BMI OF 40.0-44.9, ADULT: ICD-10-CM

## 2025-04-09 LAB
25(OH)D3 SERPL-MCNC: 30.6 NG/ML
ALBUMIN SERPL-MCNC: 4 G/DL (ref 3.4–5)
ALBUMIN/GLOB SERPL: 1.6 {RATIO} (ref 1.1–2.2)
ALP SERPL-CCNC: 71 U/L (ref 40–129)
ALT SERPL-CCNC: 15 U/L (ref 10–40)
ANION GAP SERPL CALCULATED.3IONS-SCNC: 10 MMOL/L (ref 3–16)
AST SERPL-CCNC: 18 U/L (ref 15–37)
BILIRUB SERPL-MCNC: <0.2 MG/DL (ref 0–1)
BUN SERPL-MCNC: 19 MG/DL (ref 7–20)
CALCIUM SERPL-MCNC: 9.3 MG/DL (ref 8.3–10.6)
CHLORIDE SERPL-SCNC: 101 MMOL/L (ref 99–110)
CHOLEST SERPL-MCNC: 203 MG/DL (ref 0–199)
CO2 SERPL-SCNC: 28 MMOL/L (ref 21–32)
CREAT SERPL-MCNC: 0.8 MG/DL (ref 0.6–1.1)
EST. AVERAGE GLUCOSE BLD GHB EST-MCNC: 114 MG/DL
FOLATE SERPL-MCNC: 38.9 NG/ML (ref 4.78–24.2)
GFR SERPLBLD CREATININE-BSD FMLA CKD-EPI: >90 ML/MIN/{1.73_M2}
GLUCOSE SERPL-MCNC: 86 MG/DL (ref 70–99)
HBA1C MFR BLD: 5.6 %
HDLC SERPL-MCNC: 46 MG/DL (ref 40–60)
LDLC SERPL CALC-MCNC: ABNORMAL MG/DL
LDLC SERPL-MCNC: 102 MG/DL
POTASSIUM SERPL-SCNC: 3.3 MMOL/L (ref 3.5–5.1)
PROT SERPL-MCNC: 6.5 G/DL (ref 6.4–8.2)
SODIUM SERPL-SCNC: 139 MMOL/L (ref 136–145)
TRIGL SERPL-MCNC: 402 MG/DL (ref 0–150)
TSH SERPL DL<=0.005 MIU/L-ACNC: 1.02 UIU/ML (ref 0.27–4.2)
VIT B12 SERPL-MCNC: 550 PG/ML (ref 211–911)
VLDLC SERPL CALC-MCNC: ABNORMAL MG/DL

## 2025-04-10 ENCOUNTER — TELEPHONE (OUTPATIENT)
Dept: PULMONOLOGY | Age: 45
End: 2025-04-10

## 2025-04-10 NOTE — TELEPHONE ENCOUNTER
Talked to patient today. She had set up appt 4/3 and missed it. She will call [facility] to get rescheduled and then will call office to schedule the 31-90 follow up appt.

## 2025-06-05 ENCOUNTER — RESULTS FOLLOW-UP (OUTPATIENT)
Dept: BARIATRICS/WEIGHT MGMT | Age: 45
End: 2025-06-05

## (undated) DEVICE — CATHETER 5FR CORDIS PIG 145DEG 110CM

## (undated) DEVICE — GLIDESHEATH SLENDER NITINOL HYDROPHILIC COATED INTRODUCER SHEATH: Brand: GLIDESHEATH SLENDER

## (undated) DEVICE — 260 CM J TIP WIRE .035

## (undated) DEVICE — TR BAND RADIAL ARTERY COMPRESSION DEVICE: Brand: TR BAND

## (undated) DEVICE — CATHETER 5FR JL3.5 CORDIS 100CM

## (undated) DEVICE — CATHETER DIAG AD 5FR L100CM COR NYL JUDKINS R 5 DILATED